# Patient Record
Sex: FEMALE | Race: WHITE | Employment: FULL TIME | RURAL
[De-identification: names, ages, dates, MRNs, and addresses within clinical notes are randomized per-mention and may not be internally consistent; named-entity substitution may affect disease eponyms.]

---

## 2017-01-26 ENCOUNTER — OFFICE VISIT (OUTPATIENT)
Dept: FAMILY MEDICINE CLINIC | Age: 57
End: 2017-01-26

## 2017-01-26 VITALS
HEART RATE: 80 BPM | OXYGEN SATURATION: 99 % | BODY MASS INDEX: 19.91 KG/M2 | RESPIRATION RATE: 17 BRPM | SYSTOLIC BLOOD PRESSURE: 143 MMHG | DIASTOLIC BLOOD PRESSURE: 92 MMHG | WEIGHT: 116 LBS

## 2017-01-26 DIAGNOSIS — G89.4 CHRONIC PAIN SYNDROME: ICD-10-CM

## 2017-01-26 DIAGNOSIS — F33.41 RECURRENT MAJOR DEPRESSIVE DISORDER, IN PARTIAL REMISSION (HCC): ICD-10-CM

## 2017-01-26 DIAGNOSIS — J01.00 ACUTE MAXILLARY SINUSITIS, RECURRENCE NOT SPECIFIED: Primary | ICD-10-CM

## 2017-01-26 RX ORDER — FLUTICASONE PROPIONATE 50 MCG
2 SPRAY, SUSPENSION (ML) NASAL DAILY
Qty: 1 BOTTLE | Refills: 2 | Status: SHIPPED | OUTPATIENT
Start: 2017-01-26

## 2017-01-26 RX ORDER — AMOXICILLIN AND CLAVULANATE POTASSIUM 875; 125 MG/1; MG/1
1 TABLET, FILM COATED ORAL 2 TIMES DAILY
Qty: 20 TAB | Refills: 0 | Status: SHIPPED | OUTPATIENT
Start: 2017-01-26 | End: 2017-02-05

## 2017-01-26 RX ORDER — METHADONE HYDROCHLORIDE 5 MG/1
7.5 TABLET ORAL
Qty: 90 TAB | Refills: 0 | Status: SHIPPED | OUTPATIENT
Start: 2017-01-26 | End: 2017-02-28 | Stop reason: SDUPTHER

## 2017-01-26 RX ORDER — AMLODIPINE BESYLATE 10 MG/1
TABLET ORAL
COMMUNITY
Start: 2016-12-20 | End: 2017-03-21 | Stop reason: SDUPTHER

## 2017-01-26 NOTE — MR AVS SNAPSHOT
Visit Information Date & Time Provider Department Dept. Phone Encounter #  
 1/26/2017 10:30 AM Shawn Gonzalez NP [address] [place] [address] [id number] Your Appointments 1/31/2017  1:00 PM  
ESTABLISHED PATIENT with ROXANNA Espitiavineetgen 38 ([facility] Appt Note: 1wk fu  
 [address] [facility] [address] [facility],5Th Floor [address] [phone]  
  
   
 [address] [facility] [address] [place] [address],5[address] [address] Upcoming Health Maintenance Date Due Hepatitis C Screening 1960 Pneumococcal 19-64 Medium Risk (1 of 1 - PPSV23) 4/8/1980 DTaP/Tdap/Td series (1 - Tdap) 4/8/1982 PAP AKA CERVICAL CYTOLOGY 4/8/1982 BREAST CANCER SCRN MAMMOGRAM 4/8/2011 FOBT Q 1 YEAR AGE 50-75 4/8/2011 Allergies as of 1/26/2017  Review Complete On: 1/26/2017 By: Shawn Gonzalez NP Severity Noted Reaction Type Reactions Labetalol Medium 06/07/2016    Diarrhea Losartan Medium 02/11/2016    Diarrhea GI Intolerance Current Immunizations  Never Reviewed No immunizations on file. Not reviewed this visit You Were Diagnosed With   
  
 Codes Comments Acute maxillary sinusitis, recurrence not specified    -  Primary ICD-10-CM: J01.00 ICD-9-CM: 461.0 Recurrent major depressive disorder, in partial remission ([facility]     ICD-10-CM: F33.41 
ICD-9-CM: 296.35 Chronic pain syndrome     ICD-10-CM: G89.4 ICD-9-CM: 338. 4 Vitals BP Pulse Resp Weight(growth percentile) SpO2 BMI  
 (!) 143/92 (BP 1 Location: Left arm, BP Patient Position: Sitting) 80 17 116 lb (52.6 kg) 99% 19.91 kg/m2 OB Status Smoking Status Menopause Current Every Day Smoker Vitals History BMI and BSA Data Body Mass Index Body Surface Area  
 19.91 kg/m 2 1.54 m 2 Preferred Pharmacy Pharmacy Name Phone  [address], [address] [facility] [address] [facility] AT [facility] OF  3 & [facility] [phone] Your Updated Medication List  
  
   
This list is accurate as of: 1/26/17 12:15 PM.  Always use your most recent med list.  
  
  
  
  
 ALPRAZolam 1 mg tablet Commonly known as:  Darron Sequin Take 1 Tab by mouth three (3) times daily as needed for Anxiety. Max Daily Amount: 3 mg.  
  
 amitriptyline 25 mg tablet Commonly known as:  ELAVIL Take 1 Tab by mouth nightly. Indications: NEUROPATHIC PAIN  
  
 amLODIPine 10 mg tablet Commonly known as:  NORVASC  
  
 amoxicillin-clavulanate 875-125 mg per tablet Commonly known as:  AUGMENTIN Take 1 Tab by mouth two (2) times a day for 10 days. * chlorthalidone 25 mg tablet Commonly known as:  Melina Fleeting Take 1 Tab by mouth daily. Indications: HYPERTENSION * chlorthalidone 25 mg tablet Commonly known as:  Melina Fleeting Take 1 Tab by mouth daily. fluticasone 50 mcg/actuation nasal spray Commonly known as:  Filbert Chard 2 Sprays by Both Nostrils route daily. levETIRAcetam 1,000 mg tablet Take 1 Tab by mouth two (2) times a day. Indications: TONIC-CLONIC EPILEPSY TREATMENT ADJUNCT  
  
 * methadone 5 mg tablet Commonly known as:  DOLOPHINE Take 1 Tab by mouth every eight (8) hours as needed for Pain. Max Daily Amount: 15 mg. Indications: CHRONIC PAIN  
  
 * methadone 5 mg tablet Commonly known as:  DOLOPHINE  
1.5 tabs in am; 1 tab po pm.  Indications: CHRONIC PAIN  
  
 * methadone 5 mg tablet Commonly known as:  DOLOPHINE Take 1.5 Tabs by mouth two (2) times daily (after meals). Max Daily Amount: 15 mg.  
  
 sertraline 100 mg tablet Commonly known as:  ZOLOFT Take 1 Tab by mouth daily. Indications: ANXIETY WITH DEPRESSION  
  
 * topiramate 25 mg tablet Commonly known as:  TOPAMAX TAKE 1 TABLET BY MOUTH EVERY NIGHT AT BEDTIME FOR 1 WEEK THEN INCREASE TO 1 TABLET BY MOUTH TWICE DAILY FOR PAIN  
  
 * topiramate 50 mg tablet Commonly known as:  TOPAMAX Take 1 Tab by mouth two (2) times a day. * Notice: This list has 7 medication(s) that are the same as other medications prescribed for you. Read the directions carefully, and ask your doctor or other care provider to review them with you. Prescriptions Printed Refills  
 methadone (DOLOPHINE) 5 mg tablet 0 Sig: Take 1.5 Tabs by mouth two (2) times daily (after meals). Max Daily Amount: 15 mg.  
 Class: Print Route: Oral  
  
Prescriptions Sent to Pharmacy Refills  
 amoxicillin-clavulanate (AUGMENTIN) 875-125 mg per tablet 0 Sig: Take 1 Tab by mouth two (2) times a day for 10 days. Class: Normal  
 Pharmacy: [facility] [address]  Ph #: [phone] Route: Oral  
 fluticasone (FLONASE) 50 mcg/actuation nasal spray 2 Si Sprays by Both Nostrils route daily. Class: Normal  
 Pharmacy: [facility] [address]  Ph #: [phone] Route: Both Nostrils Introducing [facility]! Becky Miguel introduces [employer] patient portal. Now you can access parts of your medical record, email your doctor's office, and request medication refills online. 1. In your internet browser, go to [email or web address] 2. Click on the First Time User? Click Here link in the Sign In box. You will see the New Member Sign Up page. 3. Enter your [employer] Access Code exactly as it appears below. You will not need to use this code after youve completed the sign-up process. If you do not sign up before the expiration date, you must request a new code. · [employer] Access Code: [email or web address] Expires: 2017 12:15 PM 
 
4. Enter the last four digits of your Social Security Number (xxxx) and Date of Birth (mm/dd/yyyy) as indicated and click Submit. You will be taken to the next sign-up page. 5. Create a [employer] ID. This will be your [employer] login ID and cannot be changed, so think of one that is secure and easy to remember. 6. Create a [employer] password. You can change your password at any time. 7. Enter your Password Reset Question and Answer. This can be used at a later time if you forget your password. 8. Enter your e-mail address. You will receive e-mail notification when new information is available in [address] 9. Click Sign Up. You can now view and download portions of your medical record. 10. Click the Download Summary menu link to download a portable copy of your medical information. If you have questions, please visit the Frequently Asked Questions section of the [employer] website. Remember, [employer] is NOT to be used for urgent needs. For medical emergencies, dial 911. Now available from your iPhone and Android! Please provide this summary of care documentation to your next provider. Your primary care clinician is listed as James Cardenas. If you have any questions after today's visit, please call [phone].

## 2017-01-26 NOTE — PROGRESS NOTES
1/28/2017    Chief Complaint   Patient presents with    Medication Refill       HPI: Kayley Pal is a 55 y.o. female. Complicated history, depression. Doesn't want anyone to know that I am sick. Tearful. Needs refill of her pain medication. She did attend [facility] IOP program. She monge not want to go back. States she is drinking 3-4 beers a day. New Problem: URI X past 10-14 days. Nasal congestion, headache and pressure. Allergies   Allergen Reactions    Labetalol Diarrhea    Losartan Diarrhea     GI Intolerance         Current Outpatient Prescriptions   Medication Sig Dispense Refill    amLODIPine (NORVASC) 10 mg tablet       methadone (DOLOPHINE) 5 mg tablet Take 1.5 Tabs by mouth two (2) times daily (after meals). Max Daily Amount: 15 mg. 90 Tab 0    amoxicillin-clavulanate (AUGMENTIN) 875-125 mg per tablet Take 1 Tab by mouth two (2) times a day for 10 days. 20 Tab 0    fluticasone (FLONASE) 50 mcg/actuation nasal spray 2 Sprays by Both Nostrils route daily. 1 Bottle 2    ALPRAZolam (XANAX) 1 mg tablet Take 1 Tab by mouth three (3) times daily as needed for Anxiety. Max Daily Amount: 3 mg. 90 Tab 2    methadone (DOLOPHINE) 5 mg tablet 1.5 tabs in am; 1 tab po pm.  Indications: CHRONIC PAIN 75 Tab 0    amitriptyline (ELAVIL) 25 mg tablet Take 1 Tab by mouth nightly. Indications: NEUROPATHIC PAIN 30 Tab 2    topiramate (TOPAMAX) 50 mg tablet Take 1 Tab by mouth two (2) times a day. 60 Tab 5    chlorthalidone (HYGROTEN) 25 mg tablet Take 1 Tab by mouth daily. 30 Tab 11    topiramate (TOPAMAX) 25 mg tablet TAKE 1 TABLET BY MOUTH EVERY NIGHT AT BEDTIME FOR 1 WEEK THEN INCREASE TO 1 TABLET BY MOUTH TWICE DAILY FOR PAIN 60 Tab 0    methadone (DOLOPHINE) 5 mg tablet Take 1 Tab by mouth every eight (8) hours as needed for Pain. Max Daily Amount: 15 mg. Indications: CHRONIC PAIN 90 Tab 0    chlorthalidone (HYGROTEN) 25 mg tablet Take 1 Tab by mouth daily.  Indications: HYPERTENSION 30 Tab 5    sertraline (ZOLOFT) 100 mg tablet Take 1 Tab by mouth daily. Indications: ANXIETY WITH DEPRESSION 30 Tab 5    levETIRAcetam 1,000 mg tablet Take 1 Tab by mouth two (2) times a day. Indications: TONIC-CLONIC EPILEPSY TREATMENT ADJUNCT 60 Tab 5       Past Medical History   Diagnosis Date    Anxiety     Chronic pain     Depression     Hypertension     Insomnia        Lab Results   Component Value Date/Time    Glucose 96 10/27/2016 11:11 AM    Creatinine 0.83 10/27/2016 11:11 AM       ROS:  Constitutional: No fever, chills or weight loss  Respiratory: No cough, SOB   CV: No chest pain or Palpitations  GI: No nausea, vomiting or diarrhea. : No dysuria or hematuria. Neuro: No headaches, seizures, change in mental status. Physical Exam:   VS Visit Vitals    BP (!) 143/92 (BP 1 Location: Left arm, BP Patient Position: Sitting)    Pulse 80    Resp 17    Wt 116 lb (52.6 kg)    SpO2 99%    BMI 19.91 kg/m2      General  Alert, crying. Flat affect. HEENT Eyes are puffy. Tender bilateral maxillary sinuses. Nares: Mucosa is erythematous, swollen with mucopurulent rhinorrhea  Pharynx clear. No nodes   RESP Clear to auscultation and percussion. No rales, wheezes, rhonchi, or rubs. CV RRR, with no S3 or S4, no murmur, no rub. Aorta: no bruit. MSKEL Gait: forward flexed. Normal strength and tone, no atrophy. EXT No deformity. Extremities without edema. SKIN Skin warm, normal turgor. NEURO Cranial nerves normal 2-12. No abnormal movement  Sensation vibration and filament   DTR 2+ in upper and lower extremities. PSYCH Judgment and insight good. Oriented to person, place, and time. Affect is alert and attentive. 1. Recurrent major depressive disorder, in partial remission ([facility]  Offer support. 2. Chronic pain syndrome  Review PDP. Refill meds. Continue current dose X 1 month   - methadone (DOLOPHINE) 5 mg tablet;  Take 1.5 Tabs by mouth two (2) times daily (after meals). Max Daily Amount: 15 mg. Dispense: 90 Tab; Refill: 0    3. Acute maxillary sinusitis, recurrence not specified  Self care: Mucinex, nasal saline.   - amoxicillin-clavulanate (AUGMENTIN) 875-125 mg per tablet; Take 1 Tab by mouth two (2) times a day for 10 days. Dispense: 20 Tab; Refill: 0  - fluticasone (FLONASE) 50 mcg/actuation nasal spray; 2 Sprays by Both Nostrils route daily. Dispense: 1 Bottle; Refill: 2      Orders Placed This Encounter    amLODIPine (NORVASC) 10 mg tablet    methadone (DOLOPHINE) 5 mg tablet     Sig: Take 1.5 Tabs by mouth two (2) times daily (after meals). Max Daily Amount: 15 mg. Dispense:  90 Tab     Refill:  0    amoxicillin-clavulanate (AUGMENTIN) 875-125 mg per tablet     Sig: Take 1 Tab by mouth two (2) times a day for 10 days. Dispense:  20 Tab     Refill:  0    fluticasone (FLONASE) 50 mcg/actuation nasal spray     Si Sprays by Both Nostrils route daily. Dispense:  1 Bottle     Refill:  2       Follow-up Disposition:  Return in about 1 month (around 2017).         JEREMY Novoa

## 2017-01-31 ENCOUNTER — OFFICE VISIT (OUTPATIENT)
Dept: FAMILY MEDICINE CLINIC | Age: 57
End: 2017-01-31

## 2017-01-31 VITALS
WEIGHT: 114.4 LBS | SYSTOLIC BLOOD PRESSURE: 162 MMHG | OXYGEN SATURATION: 97 % | DIASTOLIC BLOOD PRESSURE: 95 MMHG | RESPIRATION RATE: 16 BRPM | BODY MASS INDEX: 19.64 KG/M2 | HEART RATE: 85 BPM

## 2017-01-31 DIAGNOSIS — G40.909 SEIZURE DISORDER (HCC): ICD-10-CM

## 2017-01-31 DIAGNOSIS — I10 HYPERTENSION, ACCELERATED: ICD-10-CM

## 2017-01-31 DIAGNOSIS — J01.00 ACUTE MAXILLARY SINUSITIS, RECURRENCE NOT SPECIFIED: Primary | ICD-10-CM

## 2017-01-31 RX ORDER — NEBIVOLOL 5 MG/1
5 TABLET ORAL DAILY
Qty: 28 TAB | Refills: 0 | Status: SHIPPED | OUTPATIENT
Start: 2017-01-31 | End: 2017-02-28 | Stop reason: SDUPTHER

## 2017-01-31 RX ORDER — LEVETIRACETAM 1000 MG/1
750 TABLET ORAL 2 TIMES DAILY
Qty: 60 TAB | Refills: 11 | Status: SHIPPED | OUTPATIENT
Start: 2017-01-31 | End: 2017-02-28

## 2017-01-31 NOTE — PROGRESS NOTES
1/31/2017    Chief Complaint   Patient presents with    Sinus Infection     1 week follow up       HPI: Long Lala is a 55 y.o. female. Complicated history of chronic pain, depression, anxiety, seizure disorder. History of ETOH abuse, question of whether seizure activity was a result of ETOH withdrawal. Occurred in [place]. She minimizes use.  states she has cut back although she is drinking every day. She went to [place] and mood improved. Her mental status fluctuates. Presents today for f/u of sinusitis and emotional lability. She was tearful and agitated last week. She had sinus congestion, headache. On Augmentin and Flonase. She is feeling much better. Methadone was increased back to 15 mg BID. Will attempt to decrease with next refill. Her BP is elevated today. She had previously tried Propranolol and thought it caused vasomotor sweats. Will try adding Bystolic 5mg. Allergies   Allergen Reactions    Labetalol Diarrhea    Losartan Diarrhea     GI Intolerance         Current Outpatient Prescriptions   Medication Sig Dispense Refill    nebivolol (BYSTOLIC) 5 mg tablet Take 1 Tab by mouth daily. Indications: Hypertension 28 Tab 0    levETIRAcetam 1,000 mg tablet Take 1 Tab by mouth two (2) times a day. 60 Tab 11    amLODIPine (NORVASC) 10 mg tablet       methadone (DOLOPHINE) 5 mg tablet Take 1.5 Tabs by mouth two (2) times daily (after meals). Max Daily Amount: 15 mg. 90 Tab 0    amoxicillin-clavulanate (AUGMENTIN) 875-125 mg per tablet Take 1 Tab by mouth two (2) times a day for 10 days. 20 Tab 0    fluticasone (FLONASE) 50 mcg/actuation nasal spray 2 Sprays by Both Nostrils route daily. 1 Bottle 2    ALPRAZolam (XANAX) 1 mg tablet Take 1 Tab by mouth three (3) times daily as needed for Anxiety. Max Daily Amount: 3 mg. 90 Tab 2    amitriptyline (ELAVIL) 25 mg tablet Take 1 Tab by mouth nightly.  Indications: NEUROPATHIC PAIN 30 Tab 2    topiramate (TOPAMAX) 50 mg tablet Take 1 Tab by mouth two (2) times a day. 60 Tab 5    chlorthalidone (HYGROTEN) 25 mg tablet Take 1 Tab by mouth daily. 30 Tab 11    topiramate (TOPAMAX) 25 mg tablet TAKE 1 TABLET BY MOUTH EVERY NIGHT AT BEDTIME FOR 1 WEEK THEN INCREASE TO 1 TABLET BY MOUTH TWICE DAILY FOR PAIN 60 Tab 0    methadone (DOLOPHINE) 5 mg tablet Take 1 Tab by mouth every eight (8) hours as needed for Pain. Max Daily Amount: 15 mg. Indications: CHRONIC PAIN 90 Tab 0    chlorthalidone (HYGROTEN) 25 mg tablet Take 1 Tab by mouth daily. Indications: HYPERTENSION 30 Tab 5    sertraline (ZOLOFT) 100 mg tablet Take 1 Tab by mouth daily. Indications: ANXIETY WITH DEPRESSION 30 Tab 5    levETIRAcetam 1,000 mg tablet Take 1 Tab by mouth two (2) times a day. Indications: TONIC-CLONIC EPILEPSY TREATMENT ADJUNCT 60 Tab 5       Past Medical History   Diagnosis Date    Anxiety     Chronic pain     Depression     Hypertension     Insomnia        Lab Results   Component Value Date/Time    Glucose 96 10/27/2016 11:11 AM    Creatinine 0.83 10/27/2016 11:11 AM       ROS:  Constitutional: No fever, chills or weight loss  Respiratory: No cough, SOB   CV: No chest pain or Palpitations  GI: No nausea, vomiting or diarrhea. : No dysuria or hematuria. Neuro: No headaches, seizures, change in mental status. Physical Exam:   VS Visit Vitals    BP (!) 162/95 (BP 1 Location: Right arm, BP Patient Position: Sitting)    Pulse 85    Resp 16    Wt 114 lb 6.4 oz (51.9 kg)    SpO2 97%    BMI 19.64 kg/m2      General  Alert, oriented X 3. Mood is pleasant, calm. ENMT External ears and nose normal.  Canals normal, TMs normal.   Maxillary and frontal sinuses Non-tender to palpation. Lips, teeth, gums normal, mucous membranes moist.    Oropharynx: no erythema, no exudates, no lesions, normal tongue. NECK Thyroid: normal size, nontender. Trachea midline, neck symmetrical and without masses. Carotids 2+ with no bruits. No enlarged nodes.    RESP Clear to auscultation and percussion. CV RRR. MSKEL Normal gait and station. Normal strength and tone, no atrophy. EXT No deformity. Extremities without edema. SKIN Skin warm, normal turgor. NEURO Cranial nerves normal 2-12. PSYCH Judgment and insight fair. Oriented to person, place, and time. Affect is alert and attentive. 1. Acute maxillary sinusitis, recurrence not specified  Much better on Augmentin. Continue. 2. Hypertension, accelerated  Add Bystolic 5mg #34 1 po daily    3. Seizure disorder (HCC)  Stable   Needs refill    4. BipolarDisorder / Depression/ ETOH abuse. Her mental status and mood is much better today. Monitor. Follow-up Disposition:  Return in about 3 weeks (around 2/21/2017).         JEREMY Raymond

## 2017-01-31 NOTE — MR AVS SNAPSHOT
Visit Information Date & Time Provider Department Dept. Phone Encounter #  
 1/31/2017  1:00 PM Vern Virk NP [facility] [id number] Upcoming Health Maintenance Date Due Hepatitis C Screening 1960 Pneumococcal 19-64 Medium Risk (1 of 1 - PPSV23) 4/8/1980 DTaP/Tdap/Td series (1 - Tdap) 4/8/1982 PAP AKA CERVICAL CYTOLOGY 4/8/1982 BREAST CANCER SCRN MAMMOGRAM 4/8/2011 FOBT Q 1 YEAR AGE 50-75 4/8/2011 Allergies as of 1/31/2017  Review Complete On: 1/31/2017 By: Vern Virk NP Severity Noted Reaction Type Reactions Labetalol Medium 06/07/2016    Diarrhea Losartan Medium 02/11/2016    Diarrhea GI Intolerance Current Immunizations  Never Reviewed No immunizations on file. Not reviewed this visit Vitals BP Pulse Resp Weight(growth percentile) SpO2 BMI  
 (!) 162/95 (BP 1 Location: Right arm, BP Patient Position: Sitting) 85 16 114 lb 6.4 oz (51.9 kg) 97% 19.64 kg/m2 OB Status Smoking Status Menopause Current Every Day Smoker Vitals History BMI and BSA Data Body Mass Index Body Surface Area 19.64 kg/m 2 1.53 m 2 Preferred Pharmacy Pharmacy Name Phone [address] [facility] AT [facility] OF  3 & [facility]. Radha Aponte [phone] Your Updated Medication List  
  
   
This list is accurate as of: 1/31/17  1:40 PM.  Always use your most recent med list.  
  
  
  
  
 ALPRAZolam 1 mg tablet Commonly known as:  Shanae Maria R Take 1 Tab by mouth three (3) times daily as needed for Anxiety. Max Daily Amount: 3 mg.  
  
 amitriptyline 25 mg tablet Commonly known as:  ELAVIL Take 1 Tab by mouth nightly. Indications: NEUROPATHIC PAIN  
  
 amLODIPine 10 mg tablet Commonly known as:  NORVASC  
  
 amoxicillin-clavulanate 875-125 mg per tablet Commonly known as:  AUGMENTIN  
 Take 1 Tab by mouth two (2) times a day for 10 days. * chlorthalidone 25 mg tablet Commonly known as:  Anita Bolk Take 1 Tab by mouth daily. Indications: HYPERTENSION * chlorthalidone 25 mg tablet Commonly known as:  Anita Bolk Take 1 Tab by mouth daily. fluticasone 50 mcg/actuation nasal spray Commonly known as:  Natasha Serene 2 Sprays by Both Nostrils route daily. levETIRAcetam 1,000 mg tablet Take 1 Tab by mouth two (2) times a day. Indications: TONIC-CLONIC EPILEPSY TREATMENT ADJUNCT  
  
 * methadone 5 mg tablet Commonly known as:  DOLOPHINE Take 1 Tab by mouth every eight (8) hours as needed for Pain. Max Daily Amount: 15 mg. Indications: CHRONIC PAIN  
  
 * methadone 5 mg tablet Commonly known as:  DOLOPHINE Take 1.5 Tabs by mouth two (2) times daily (after meals). Max Daily Amount: 15 mg.  
  
 sertraline 100 mg tablet Commonly known as:  ZOLOFT Take 1 Tab by mouth daily. Indications: ANXIETY WITH DEPRESSION  
  
 * topiramate 25 mg tablet Commonly known as:  TOPAMAX TAKE 1 TABLET BY MOUTH EVERY NIGHT AT BEDTIME FOR 1 WEEK THEN INCREASE TO 1 TABLET BY MOUTH TWICE DAILY FOR PAIN  
  
 * topiramate 50 mg tablet Commonly known as:  TOPAMAX Take 1 Tab by mouth two (2) times a day. * Notice: This list has 6 medication(s) that are the same as other medications prescribed for you. Read the directions carefully, and ask your doctor or other care provider to review them with you. Introducing hospitals & [facility]! Alaina Kowalski introduces [employer] patient portal. Now you can access parts of your medical record, email your doctor's office, and request medication refills online. 1. In your internet browser, go to [email or web address] 2. Click on the First Time User? Click Here link in the Sign In box. You will see the New Member Sign Up page. 3. Enter your [employer] Access Code exactly as it appears below.  You will not need to use this code after youve completed the sign-up process. If you do not sign up before the expiration date, you must request a new code. · [employer] Access Code: [email or web address] Expires: 4/26/2017 12:15 PM 
 
4. Enter the last four digits of your Social Security Number (xxxx) and Date of Birth (mm/dd/yyyy) as indicated and click Submit. You will be taken to the next sign-up page. 5. Create a [employer] ID. This will be your [employer] login ID and cannot be changed, so think of one that is secure and easy to remember. 6. Create a [employer] password. You can change your password at any time. 7. Enter your Password Reset Question and Answer. This can be used at a later time if you forget your password. 8. Enter your e-mail address. You will receive e-mail notification when new information is available in [address] 9. Click Sign Up. You can now view and download portions of your medical record. 10. Click the Download Summary menu link to download a portable copy of your medical information. If you have questions, please visit the Frequently Asked Questions section of the [employer] website. Remember, [employer] is NOT to be used for urgent needs. For medical emergencies, dial 911. Now available from your iPhone and Android! Please provide this summary of care documentation to your next provider. Your primary care clinician is listed as Estefania Bliss. If you have any questions after today's visit, please call [phone].

## 2017-02-03 DIAGNOSIS — G89.4 CHRONIC PAIN SYNDROME: ICD-10-CM

## 2017-02-03 DIAGNOSIS — F33.41 RECURRENT MAJOR DEPRESSIVE DISORDER, IN PARTIAL REMISSION (HCC): ICD-10-CM

## 2017-02-03 NOTE — TELEPHONE ENCOUNTER
[phone] contact number, per patient need refill on following medication as need a hard copy as patient is leaving for [place] today @ 3:00pm.  Please call asap when you get script ready [phone]. Medication is  Methadone (DOLOPHINE) 5 mg tablet 1 1/2 am & 1 1/2 pm   Eliza Cote please give patient a call. ....   Thanks,

## 2017-02-09 RX ORDER — METHADONE HYDROCHLORIDE 5 MG/1
5 TABLET ORAL
Qty: 90 TAB | Refills: 0 | Status: SHIPPED | OUTPATIENT
Start: 2017-02-09 | End: 2017-02-28

## 2017-02-21 ENCOUNTER — TELEPHONE (OUTPATIENT)
Dept: FAMILY MEDICINE CLINIC | Age: 57
End: 2017-02-21

## 2017-02-21 NOTE — TELEPHONE ENCOUNTER
[phone] contact # per patient, please call when you get a minute as paperwork is all messed up with her & Antwan Drummond and she doesn't want to give him the wrong thing, it's concerning medications and need a little help.   Thanks,

## 2017-02-21 NOTE — TELEPHONE ENCOUNTER
Kelly Walter asked if Cas's nurse could call her back at [phone] in regards to medication. Thank you.

## 2017-02-28 ENCOUNTER — OFFICE VISIT (OUTPATIENT)
Dept: FAMILY MEDICINE CLINIC | Age: 57
End: 2017-02-28

## 2017-02-28 VITALS
HEART RATE: 78 BPM | BODY MASS INDEX: 19.57 KG/M2 | OXYGEN SATURATION: 92 % | DIASTOLIC BLOOD PRESSURE: 102 MMHG | SYSTOLIC BLOOD PRESSURE: 190 MMHG | WEIGHT: 114 LBS | RESPIRATION RATE: 17 BRPM

## 2017-02-28 DIAGNOSIS — F32.A DEPRESSION, UNSPECIFIED DEPRESSION TYPE: ICD-10-CM

## 2017-02-28 DIAGNOSIS — G40.909 SEIZURE DISORDER (HCC): ICD-10-CM

## 2017-02-28 DIAGNOSIS — G89.4 CHRONIC PAIN SYNDROME: ICD-10-CM

## 2017-02-28 DIAGNOSIS — G47.00 INSOMNIA, UNSPECIFIED TYPE: ICD-10-CM

## 2017-02-28 RX ORDER — TOPIRAMATE 50 MG/1
50 TABLET, FILM COATED ORAL 2 TIMES DAILY
Qty: 60 TAB | Refills: 5 | Status: SHIPPED | OUTPATIENT
Start: 2017-02-28 | End: 2017-10-12 | Stop reason: SDUPTHER

## 2017-02-28 RX ORDER — AMITRIPTYLINE HYDROCHLORIDE 25 MG/1
25 TABLET, FILM COATED ORAL
Qty: 30 TAB | Refills: 2 | Status: SHIPPED | OUTPATIENT
Start: 2017-02-28 | End: 2017-03-21 | Stop reason: DRUGHIGH

## 2017-02-28 RX ORDER — METHADONE HYDROCHLORIDE 5 MG/1
7.5 TABLET ORAL
Qty: 90 TAB | Refills: 0 | Status: SHIPPED | OUTPATIENT
Start: 2017-02-28 | End: 2017-03-21 | Stop reason: SDUPTHER

## 2017-02-28 RX ORDER — NEBIVOLOL 10 MG/1
10 TABLET ORAL DAILY
Qty: 28 TAB | Refills: 0
Start: 2017-02-28 | End: 2017-05-25 | Stop reason: SDUPTHER

## 2017-02-28 NOTE — MR AVS SNAPSHOT
Visit Information Date & Time Provider Department Dept. Phone Encounter #  
 2/28/2017  2:30 PM Shawn Gonzalez NP [address] [facility] [id number] Upcoming Health Maintenance Date Due Hepatitis C Screening 1960 Pneumococcal 19-64 Medium Risk (1 of 1 - PPSV23) 4/8/1980 DTaP/Tdap/Td series (1 - Tdap) 4/8/1982 PAP AKA CERVICAL CYTOLOGY 4/8/1982 BREAST CANCER SCRN MAMMOGRAM 4/8/2011 FOBT Q 1 YEAR AGE 50-75 4/8/2011 Allergies as of 2/28/2017  Review Complete On: 2/28/2017 By: Shawn Gonzalez NP Severity Noted Reaction Type Reactions Labetalol Medium 06/07/2016    Diarrhea Losartan Medium 02/11/2016    Diarrhea GI Intolerance Current Immunizations  Never Reviewed No immunizations on file. Not reviewed this visit You Were Diagnosed With   
  
 Codes Comments Depression, unspecified depression type     ICD-10-CM: F32.9 ICD-9-CM: 273 Chronic pain syndrome     ICD-10-CM: G89.4 ICD-9-CM: 338.4 Insomnia, unspecified type     ICD-10-CM: G47.00 ICD-9-CM: 780.52 Seizure disorder ([facility] [address]     ICD-10-CM: G40.909 ICD-9-CM: 345.90 Vitals BP  
  
  
  
  
  
 (!) 190/102 (BP 1 Location: Left arm, BP Patient Position: Sitting) Vitals History BMI and BSA Data Body Mass Index Body Surface Area  
 19.57 kg/m 2 1.53 m 2 Preferred Pharmacy Pharmacy Name Phone [address], [address] AT [facility] OF  [address] & [facility]. Mi Salguero [phone] Your Updated Medication List  
  
   
This list is accurate as of: 2/28/17  4:46 PM.  Always use your most recent med list.  
  
  
  
  
 ALPRAZolam 1 mg tablet Commonly known as:  Emeka Beecham Take 1 Tab by mouth three (3) times daily as needed for Anxiety. Max Daily Amount: 3 mg.  
  
 amitriptyline 25 mg tablet Commonly known as:  ELAVIL  
 Take 1 Tab by mouth nightly. Indications: NEUROPATHIC PAIN  
  
 amLODIPine 10 mg tablet Commonly known as:  NORVASC  
  
 chlorthalidone 25 mg tablet Commonly known as:  Nevin Pulling Take 1 Tab by mouth daily. Indications: HYPERTENSION  
  
 fluticasone 50 mcg/actuation nasal spray Commonly known as:  Caffie Euler 2 Sprays by Both Nostrils route daily. levETIRAcetam 1,000 mg tablet Take 1 Tab by mouth two (2) times a day. Indications: TONIC-CLONIC EPILEPSY TREATMENT ADJUNCT  
  
 methadone 5 mg tablet Commonly known as:  DOLOPHINE Take 1.5 Tabs by mouth two (2) times daily (after meals). Max Daily Amount: 15 mg.  
  
 nebivolol 10 mg tablet Commonly known as:  BYSTOLIC Take 1 Tab by mouth daily. Indications: hypertension  
  
 sertraline 100 mg tablet Commonly known as:  ZOLOFT Take 1 Tab by mouth daily. Indications: ANXIETY WITH DEPRESSION  
  
 topiramate 50 mg tablet Commonly known as:  TOPAMAX Take 1 Tab by mouth two (2) times a day. Prescriptions Printed Refills  
 methadone (DOLOPHINE) 5 mg tablet 0 Sig: Take 1.5 Tabs by mouth two (2) times daily (after meals). Max Daily Amount: 15 mg.  
 Class: Print Route: Oral  
  
Prescriptions Sent to Pharmacy Refills  
 amitriptyline (ELAVIL) 25 mg tablet 2 Sig: Take 1 Tab by mouth nightly. Indications: NEUROPATHIC PAIN Class: Normal  
 Pharmacy: [facility] [address]  Ph #: [phone] Route: Oral  
 topiramate (TOPAMAX) 50 mg tablet 5 Sig: Take 1 Tab by mouth two (2) times a day. Class: Normal  
 Pharmacy: [facility] [address]  Ph #: [phone] Route: Oral  
  
Introducing [facility]!    
 Lovely Jones introduces [employer] patient portal. Now you can access parts of your medical record, email your doctor's office, and request medication refills online. 1. In your internet browser, go to [email or web address] 2. Click on the First Time User? Click Here link in the Sign In box. You will see the New Member Sign Up page. 3. Enter your [employer] Access Code exactly as it appears below. You will not need to use this code after youve completed the sign-up process. If you do not sign up before the expiration date, you must request a new code. · [employer] Access Code: [email or web address] Expires: 4/26/2017 12:15 PM 
 
4. Enter the last four digits of your Social Security Number (xxxx) and Date of Birth (mm/dd/yyyy) as indicated and click Submit. You will be taken to the next sign-up page. 5. Create a Fromography ID. This will be your [employer] login ID and cannot be changed, so think of one that is secure and easy to remember. 6. Create a [employer] password. You can change your password at any time. 7. Enter your Password Reset Question and Answer. This can be used at a later time if you forget your password. 8. Enter your e-mail address. You will receive e-mail notification when new information is available in [address] 9. Click Sign Up. You can now view and download portions of your medical record. 10. Click the Download Summary menu link to download a portable copy of your medical information. If you have questions, please visit the Frequently Asked Questions section of the [employer] website. Remember, [employer] is NOT to be used for urgent needs. For medical emergencies, dial 911. Now available from your iPhone and Android! Please provide this summary of care documentation to your next provider. Your primary care clinician is listed as Diane Chauhan. If you have any questions after today's visit, please call [phone].

## 2017-03-01 NOTE — PROGRESS NOTES
2/28/2017    Chief Complaint   Patient presents with    Medication Refill       HPI: Kayley Pal is a 55 y.o. female. Presents for refill of her pain medication. BP elevated. Allergies   Allergen Reactions    Labetalol Diarrhea    Losartan Diarrhea     GI Intolerance         Current Outpatient Prescriptions   Medication Sig Dispense Refill    nebivolol (BYSTOLIC) 10 mg tablet Take 1 Tab by mouth daily. Indications: hypertension 28 Tab 0    amitriptyline (ELAVIL) 25 mg tablet Take 1 Tab by mouth nightly. Indications: NEUROPATHIC PAIN 30 Tab 2    topiramate (TOPAMAX) 50 mg tablet Take 1 Tab by mouth two (2) times a day. 60 Tab 5    methadone (DOLOPHINE) 5 mg tablet Take 1.5 Tabs by mouth two (2) times daily (after meals). Max Daily Amount: 15 mg. 90 Tab 0    amLODIPine (NORVASC) 10 mg tablet       fluticasone (FLONASE) 50 mcg/actuation nasal spray 2 Sprays by Both Nostrils route daily. 1 Bottle 2    ALPRAZolam (XANAX) 1 mg tablet Take 1 Tab by mouth three (3) times daily as needed for Anxiety. Max Daily Amount: 3 mg. 90 Tab 2    chlorthalidone (HYGROTEN) 25 mg tablet Take 1 Tab by mouth daily. Indications: HYPERTENSION 30 Tab 5    sertraline (ZOLOFT) 100 mg tablet Take 1 Tab by mouth daily. Indications: ANXIETY WITH DEPRESSION 30 Tab 5    levETIRAcetam 1,000 mg tablet Take 1 Tab by mouth two (2) times a day. Indications: TONIC-CLONIC EPILEPSY TREATMENT ADJUNCT 60 Tab 5       Past Medical History:   Diagnosis Date    Anxiety     Chronic pain     Depression     Hypertension     Insomnia        Lab Results   Component Value Date/Time    Glucose 96 10/27/2016 11:11 AM    Creatinine 0.83 10/27/2016 11:11 AM       ROS:  Constitutional: No fever, chills or weight loss  Respiratory: No cough, SOB   CV: No chest pain or Palpitations  GI: No nausea, vomiting or diarrhea. : No dysuria or hematuria. Neuro: No headaches, seizures, change in mental status.     Physical Exam:   VS Visit Vitals    BP (!) 190/102 (BP 1 Location: Left arm, BP Patient Position: Sitting)    Pulse 78    Resp 17    Wt 114 lb (51.7 kg)    SpO2 92%    BMI 19.57 kg/m2      General  Alert, oriented. NAD. Affect is calm and pleasant. RESP Clear to auscultation and percussion. No rales, wheezes, rhonchi, or rubs. CV RRR, with no S3 or S4, no murmur, no rub. Aorta: no bruit. SKIN Skin warm, normal turgor. NEURO Cranial nerves normal 2-12. PSYCH Judgment and insight good. Oriented to person, place, and time. Affect is alert and attentive. 1. Depression, unspecified depression type  Refill   - amitriptyline (ELAVIL) 25 mg tablet; Take 1 Tab by mouth nightly. Indications: NEUROPATHIC PAIN  Dispense: 30 Tab; Refill: 2    2. Chronic pain syndrome  Refill   - amitriptyline (ELAVIL) 25 mg tablet; Take 1 Tab by mouth nightly. Indications: NEUROPATHIC PAIN  Dispense: 30 Tab; Refill: 2  - topiramate (TOPAMAX) 50 mg tablet; Take 1 Tab by mouth two (2) times a day. Dispense: 60 Tab; Refill: 5  - methadone (DOLOPHINE) 5 mg tablet; Take 1.5 Tabs by mouth two (2) times daily (after meals). Max Daily Amount: 15 mg. Dispense: 90 Tab; Refill: 0    3. Insomnia, unspecified type  Refill   - amitriptyline (ELAVIL) 25 mg tablet; Take 1 Tab by mouth nightly. Indications: NEUROPATHIC PAIN  Dispense: 30 Tab; Refill: 2    4. Seizure disorder (HCC)  Refill   - topiramate (TOPAMAX) 50 mg tablet; Take 1 Tab by mouth two (2) times a day. Dispense: 60 Tab; Refill: 5        Orders Placed This Encounter    nebivolol (BYSTOLIC) 10 mg tablet     Sig: Take 1 Tab by mouth daily. Indications: hypertension     Dispense:  28 Tab     Refill:  0    amitriptyline (ELAVIL) 25 mg tablet     Sig: Take 1 Tab by mouth nightly. Indications: NEUROPATHIC PAIN     Dispense:  30 Tab     Refill:  2    topiramate (TOPAMAX) 50 mg tablet     Sig: Take 1 Tab by mouth two (2) times a day.      Dispense:  60 Tab     Refill:  5    methadone (DOLOPHINE) 5 mg tablet     Sig: Take 1.5 Tabs by mouth two (2) times daily (after meals). Max Daily Amount: 15 mg. Dispense:  90 Tab     Refill:  0       Follow-up Disposition:  Return in about 1 month (around 3/28/2017).         JEREMY Reardon

## 2017-03-21 ENCOUNTER — OFFICE VISIT (OUTPATIENT)
Dept: FAMILY MEDICINE CLINIC | Age: 57
End: 2017-03-21

## 2017-03-21 VITALS
SYSTOLIC BLOOD PRESSURE: 140 MMHG | BODY MASS INDEX: 19.33 KG/M2 | HEART RATE: 71 BPM | RESPIRATION RATE: 18 BRPM | WEIGHT: 112.6 LBS | DIASTOLIC BLOOD PRESSURE: 80 MMHG | OXYGEN SATURATION: 98 %

## 2017-03-21 DIAGNOSIS — G47.00 INSOMNIA, UNSPECIFIED TYPE: ICD-10-CM

## 2017-03-21 DIAGNOSIS — I10 ESSENTIAL HYPERTENSION WITH GOAL BLOOD PRESSURE LESS THAN 130/80: ICD-10-CM

## 2017-03-21 DIAGNOSIS — F32.A DEPRESSION, UNSPECIFIED DEPRESSION TYPE: ICD-10-CM

## 2017-03-21 DIAGNOSIS — G89.4 CHRONIC PAIN SYNDROME: ICD-10-CM

## 2017-03-21 RX ORDER — HYDROCHLOROTHIAZIDE 25 MG/1
25 TABLET ORAL DAILY
Qty: 90 TAB | Refills: 3 | Status: SHIPPED | OUTPATIENT
Start: 2017-03-21 | End: 2017-12-08 | Stop reason: ALTCHOICE

## 2017-03-21 RX ORDER — METHADONE HYDROCHLORIDE 5 MG/1
TABLET ORAL
Qty: 63 TAB | Refills: 0 | Status: SHIPPED | OUTPATIENT
Start: 2017-03-21 | End: 2017-04-25 | Stop reason: SDUPTHER

## 2017-03-21 RX ORDER — AMLODIPINE BESYLATE 10 MG/1
10 TABLET ORAL DAILY
Qty: 90 TAB | Refills: 3 | Status: SHIPPED | OUTPATIENT
Start: 2017-03-21 | End: 2017-05-25 | Stop reason: SDUPTHER

## 2017-03-21 RX ORDER — AMITRIPTYLINE HYDROCHLORIDE 50 MG/1
50 TABLET, FILM COATED ORAL
Qty: 30 TAB | Refills: 5 | Status: SHIPPED | OUTPATIENT
Start: 2017-03-21 | End: 2017-07-24

## 2017-03-21 NOTE — MR AVS SNAPSHOT
Visit Information Date & Time Provider Department Dept. Phone Encounter #  
 3/21/2017 11:30 AM Denia Redding NP [address] [place] [address] [id number] Your Appointments 4/25/2017  1:30 PM  
ESTABLISHED PATIENT with Denia Redding NP  
Giulia Melvin ([address]) Appt Note: 1 MO F/U  
 1000 [facility] [address] [facility],[address] [address] [phone]  
  
   
 [address] [address] Upcoming Health Maintenance Date Due Hepatitis C Screening 1960 Pneumococcal 19-64 Medium Risk (1 of 1 - PPSV23) 4/8/1980 DTaP/Tdap/Td series (1 - Tdap) 4/8/1982 PAP AKA CERVICAL CYTOLOGY 4/8/1982 BREAST CANCER SCRN MAMMOGRAM 4/8/2011 FOBT Q 1 YEAR AGE 50-75 4/8/2011 Allergies as of 3/21/2017  Review Complete On: 3/21/2017 By: Denia Redding NP Severity Noted Reaction Type Reactions Labetalol Medium 06/07/2016    Diarrhea Losartan Medium 02/11/2016    Diarrhea GI Intolerance Current Immunizations  Reviewed on 3/21/2017 No immunizations on file. Reviewed by Puma Leal on 3/21/2017 at 12:10 PM  
You Were Diagnosed With   
  
 Codes Comments Depression, unspecified depression type     ICD-10-CM: F32.9 ICD-9-CM: 761 Chronic pain syndrome     ICD-10-CM: G89.4 ICD-9-CM: 338.4 Insomnia, unspecified type     ICD-10-CM: G47.00 ICD-9-CM: 780.52 Essential hypertension with goal blood pressure less than 130/80     ICD-10-CM: I10 
ICD-9-CM: 401.9 Vitals BP Pulse Resp Weight(growth percentile) SpO2 BMI  
 140/80 (BP 1 Location: Left arm, BP Patient Position: Sitting) 71 18 112 lb 9.6 oz (51.1 kg) 98% 19.33 kg/m2 OB Status Smoking Status Menopause Current Every Day Smoker Vitals History BMI and BSA Data Body Mass Index Body Surface Area  
 19.33 kg/m 2 1.52 m 2 Preferred Pharmacy Pharmacy Name Phone [facility], [address] [facility] AT [facility] OF [address] & [address] [address]. Anne Maynard [phone] Your Updated Medication List  
  
   
This list is accurate as of: 3/21/17  1:13 PM.  Always use your most recent med list.  
  
  
  
  
 ALPRAZolam 1 mg tablet Commonly known as:  XANAX  
TAKE 1 TABLET BY MOUTH THREE TIMES DAILY AS NEEDED ANXIETY. DO NOT TAKE MORE THAN 3MG IN 24 HOURS. amitriptyline 50 mg tablet Commonly known as:  ELAVIL Take 1 Tab by mouth nightly. amLODIPine 10 mg tablet Commonly known as:  Leticia Million Take 1 Tab by mouth daily. chlorthalidone 25 mg tablet Commonly known as:  Rosario Bowling Take 1 Tab by mouth daily. Indications: HYPERTENSION  
  
 fluticasone 50 mcg/actuation nasal spray Commonly known as:  Jeanette Jose 2 Sprays by Both Nostrils route daily. hydroCHLOROthiazide 25 mg tablet Commonly known as:  HYDRODIURIL Take 1 Tab by mouth daily. levETIRAcetam 1,000 mg tablet Take 1 Tab by mouth two (2) times a day. Indications: TONIC-CLONIC EPILEPSY TREATMENT ADJUNCT  
  
 methadone 5 mg tablet Commonly known as:  DOLOPHINE  
1.5 tabs po BIDF X 7 days then 1 tab po BID  
  
 nebivolol 10 mg tablet Commonly known as:  BYSTOLIC Take 1 Tab by mouth daily. Indications: hypertension  
  
 sertraline 100 mg tablet Commonly known as:  ZOLOFT Take 1 Tab by mouth daily. Indications: ANXIETY WITH DEPRESSION  
  
 topiramate 50 mg tablet Commonly known as:  TOPAMAX Take 1 Tab by mouth two (2) times a day. Prescriptions Printed Refills  
 methadone (DOLOPHINE) 5 mg tablet 0 Si.5 tabs po BIDF X 7 days then 1 tab po BID Class: Print Prescriptions Sent to Pharmacy Refills  
 amitriptyline (ELAVIL) 50 mg tablet 5 Sig: Take 1 Tab by mouth nightly.   
 Class: Normal  
 Pharmacy: [facility], [address] [facility] of  [address] [facility] Ph #: [phone] Route: Oral  
 amLODIPine (NORVASC) 10 mg tablet 3 Sig: Take 1 Tab by mouth daily. Class: Normal  
 Pharmacy: [facility] [address]  Ph #: [phone] Route: Oral  
 hydroCHLOROthiazide (HYDRODIURIL) 25 mg tablet 3 Sig: Take 1 Tab by mouth daily. Class: Normal  
 Pharmacy: [facility] [address]  Ph #: [phone] Route: Oral  
  
We Performed the Following PAIN MGMT PANEL W/REFL, UR [[id number] Custom] Introducing [facility] SERVICES! [facility] introduces [employer] patient portal. Now you can access parts of your medical record, email your doctor's office, and request medication refills online. 1. In your internet browser, go to [email or web address] 2. Click on the First Time User? Click Here link in the Sign In box. You will see the New Member Sign Up page. 3. Enter your [employer] Access Code exactly as it appears below. You will not need to use this code after youve completed the sign-up process. If you do not sign up before the expiration date, you must request a new code. · [employer] Access Code: [email or web address] Expires: 4/26/2017  1:15 PM 
 
4. Enter the last four digits of your Social Security Number (xxxx) and Date of Birth (mm/dd/yyyy) as indicated and click Submit. You will be taken to the next sign-up page. 5. Create a [employer] ID. This will be your [employer] login ID and cannot be changed, so think of one that is secure and easy to remember. 6. Create a [employer] password. You can change your password at any time. 7. Enter your Password Reset Question and Answer. This can be used at a later time if you forget your password. 8. Enter your e-mail address. You will receive e-mail notification when new information is available in [address] 9. Click Sign Up. You can now view and download portions of your medical record. 10. Click the Download Summary menu link to download a portable copy of your medical information. If you have questions, please visit the Frequently Asked Questions section of the [employer] website. Remember, [employer] is NOT to be used for urgent needs. For medical emergencies, dial 911. Now available from your iPhone and Android! Please provide this summary of care documentation to your next provider. Your primary care clinician is listed as Yesenia Butler. If you have any questions after today's visit, please call [phone].

## 2017-03-30 LAB
AMPHETAMINES UR QL SCN: NEGATIVE NG/ML
BARBITURATES UR QL SCN: NEGATIVE NG/ML
BENZODIAZ UR QL: POSITIVE NG/ML
BZE UR QL SCN: NEGATIVE NG/ML
CANNABINOIDS UR QL CFM: POSITIVE
CREAT UR-MCNC: 19 MG/DL (ref 20–300)
FENTANYL+NORFENTANYL UR QL SCN: NEGATIVE PG/ML
MEPERIDINE UR QL: NEGATIVE NG/ML
METHADONE UR QL CFM: POSITIVE
OPIATES UR QL SCN: POSITIVE NG/ML
OXYCODONE+OXYMORPHONE UR QL SCN: NEGATIVE NG/ML
PCP UR QL: NEGATIVE NG/ML
PH UR: 6.1 [PH] (ref 4.5–8.9)
PLEASE NOTE:, 733157: ABNORMAL
PROPOXYPH UR QL SCN: NEGATIVE NG/ML
SP GR UR: 1
TRAMADOL UR QL SCN: NEGATIVE NG/ML

## 2017-04-17 NOTE — PROGRESS NOTES
3/21/2017    Chief Complaint   Patient presents with    Medication Refill     Elavil, Methadone,Zoloft  and Amlodipine        HPI: Earnest An is a 65 y.o. female. Complicated history of chronic pain, depression, anxiety, seizure disorder. History of ETOH abuse, question of whether seizure activity was a result of ETOH withdrawal. Occurred in [place]. She minimizes use.  states she has cut back although she is drinking every day. She went to [place] and mood improved. Her mental status fluctuates. Presents for refill of her pain medication. Tapering dose. Allergies   Allergen Reactions    Labetalol Diarrhea    Losartan Diarrhea     GI Intolerance         Current Outpatient Prescriptions   Medication Sig Dispense Refill    amitriptyline (ELAVIL) 50 mg tablet Take 1 Tab by mouth nightly. 30 Tab 5    methadone (DOLOPHINE) 5 mg tablet 1.5 tabs po BIDF X 7 days then 1 tab po BID 63 Tab 0    amLODIPine (NORVASC) 10 mg tablet Take 1 Tab by mouth daily. 90 Tab 3    hydroCHLOROthiazide (HYDRODIURIL) 25 mg tablet Take 1 Tab by mouth daily. 90 Tab 3    ALPRAZolam (XANAX) 1 mg tablet TAKE 1 TABLET BY MOUTH THREE TIMES DAILY AS NEEDED ANXIETY. DO NOT TAKE MORE THAN 3MG IN 24 HOURS. 90 Tab 0    nebivolol (BYSTOLIC) 10 mg tablet Take 1 Tab by mouth daily. Indications: hypertension 28 Tab 0    topiramate (TOPAMAX) 50 mg tablet Take 1 Tab by mouth two (2) times a day. 60 Tab 5    fluticasone (FLONASE) 50 mcg/actuation nasal spray 2 Sprays by Both Nostrils route daily. 1 Bottle 2    chlorthalidone (HYGROTEN) 25 mg tablet Take 1 Tab by mouth daily. Indications: HYPERTENSION 30 Tab 5    sertraline (ZOLOFT) 100 mg tablet Take 1 Tab by mouth daily. Indications: ANXIETY WITH DEPRESSION 30 Tab 5    levETIRAcetam 1,000 mg tablet Take 1 Tab by mouth two (2) times a day.  Indications: TONIC-CLONIC EPILEPSY TREATMENT ADJUNCT 60 Tab 5       Past Medical History:   Diagnosis Date    Anxiety     Chronic pain     Depression     Hypertension     Insomnia        Lab Results   Component Value Date/Time    Glucose 96 10/27/2016 11:11 AM    Creatinine 0.83 10/27/2016 11:11 AM       ROS:  Constitutional: No fever, chills or weight loss  Respiratory: No cough, SOB   CV: No chest pain or Palpitations  GI: No nausea, vomiting or diarrhea. : No dysuria or hematuria. Neuro: No headaches, seizures, change in mental status. Physical Exam:   VS Visit Vitals    /80 (BP 1 Location: Left arm, BP Patient Position: Sitting)    Pulse 71    Resp 18    Wt 112 lb 9.6 oz (51.1 kg)    SpO2 98%    BMI 19.33 kg/m2      General  Alert, oriented. NAD. Eyes Conjunctiva and lids normal.    PERRLA, EOMI.   ENMT Mucous membranes moist.    Oropharynx: no erythema, no exudates, no lesions, normal tongue. NECK Thyroid: normal size, nontender. Trachea midline, neck symmetrical and without masses. Carotids 2+ with no bruits. No enlarged nodes. RESP Clear to auscultation and percussion. No rales, wheezes, rhonchi, or rubs. CV RRR, with no S3 or S4, no murmur, no rub. MSKEL Normal gait and station. Normal strength and tone, no atrophy. Back: Full ROM. Non tender. EXT No deformity. Extremities without edema. SKIN Skin warm, normal turgor. NEURO Cranial nerves normal 2-12. No abnormal movement   PSYCH Judgment and insight poor. Oriented to person, place, and time. Affect is alert and attentive. 1. Depression, unspecified depression type  Refill   - amitriptyline (ELAVIL) 50 mg tablet; Take 1 Tab by mouth nightly. Dispense: 30 Tab; Refill: 5    2. Chronic pain syndrome  Refill   Reviewed PDP. Discussed continued taping of dose. - amitriptyline (ELAVIL) 50 mg tablet; Take 1 Tab by mouth nightly. Dispense: 30 Tab; Refill: 5  - methadone (DOLOPHINE) 5 mg tablet; 1.5 tabs po BIDF X 7 days then 1 tab po BID  Dispense: 63 Tab; Refill: 0  - PAIN MGMT PANEL W/REFL, UR    3.  Insomnia, unspecified type  Refill   - amitriptyline (ELAVIL) 50 mg tablet; Take 1 Tab by mouth nightly. Dispense: 30 Tab; Refill: 5    4. Essential hypertension with goal blood pressure less than 130/80  Refill   - amLODIPine (NORVASC) 10 mg tablet; Take 1 Tab by mouth daily. Dispense: 90 Tab; Refill: 3        Orders Placed This Encounter    PAIN MGMT PANEL W/REFL, UR    amitriptyline (ELAVIL) 50 mg tablet     Sig: Take 1 Tab by mouth nightly. Dispense:  30 Tab     Refill:  5    methadone (DOLOPHINE) 5 mg tablet     Si.5 tabs po BIDF X 7 days then 1 tab po BID     Dispense:  63 Tab     Refill:  0    amLODIPine (NORVASC) 10 mg tablet     Sig: Take 1 Tab by mouth daily. Dispense:  90 Tab     Refill:  3    hydroCHLOROthiazide (HYDRODIURIL) 25 mg tablet     Sig: Take 1 Tab by mouth daily. Dispense:  90 Tab     Refill:  3       Follow-up Disposition:  Return in about 1 month (around 2017).         JEREMY Rea

## 2017-04-25 ENCOUNTER — OFFICE VISIT (OUTPATIENT)
Dept: FAMILY MEDICINE CLINIC | Age: 57
End: 2017-04-25

## 2017-04-25 VITALS
WEIGHT: 106 LBS | DIASTOLIC BLOOD PRESSURE: 74 MMHG | HEIGHT: 64 IN | SYSTOLIC BLOOD PRESSURE: 132 MMHG | RESPIRATION RATE: 19 BRPM | BODY MASS INDEX: 18.1 KG/M2

## 2017-04-25 DIAGNOSIS — F41.9 ANXIETY: ICD-10-CM

## 2017-04-25 DIAGNOSIS — G89.4 CHRONIC PAIN SYNDROME: ICD-10-CM

## 2017-04-25 DIAGNOSIS — F33.1 MODERATE EPISODE OF RECURRENT MAJOR DEPRESSIVE DISORDER (HCC): ICD-10-CM

## 2017-04-25 RX ORDER — ALPRAZOLAM 1 MG/1
TABLET ORAL
Qty: 90 TAB | Refills: 0 | Status: SHIPPED | OUTPATIENT
Start: 2017-04-25 | End: 2017-05-23 | Stop reason: SDUPTHER

## 2017-04-25 RX ORDER — METHADONE HYDROCHLORIDE 5 MG/1
TABLET ORAL
Qty: 50 TAB | Refills: 0 | Status: SHIPPED | OUTPATIENT
Start: 2017-04-25 | End: 2017-10-12 | Stop reason: ALTCHOICE

## 2017-04-25 NOTE — MR AVS SNAPSHOT
Visit Information Date & Time Provider Department Dept. Phone Encounter #  
 4/25/2017  1:30 PM Jax Del Cid NP [facility] [id number] Your Appointments 6/6/2017  1:30 PM  
ESTABLISHED PATIENT with Jax Del Cid NP  
Giulia Melvin ([address]) Appt Note: 6 wk F/U  
 [address] [facility] [address] [address] [phone]  
  
   
 [address] [facility] [address] [facility],[address] [address] Upcoming Health Maintenance Date Due Hepatitis C Screening 1960 Pneumococcal 19-64 Medium Risk (1 of 1 - PPSV23) 4/8/1980 DTaP/Tdap/Td series (1 - Tdap) 4/8/1982 PAP AKA CERVICAL CYTOLOGY 4/8/1982 BREAST CANCER SCRN MAMMOGRAM 4/8/2011 FOBT Q 1 YEAR AGE 50-75 4/8/2011 Allergies as of 4/25/2017  Review Complete On: 4/25/2017 By: Jax Del Cid NP Severity Noted Reaction Type Reactions Labetalol Medium 06/07/2016    Diarrhea Losartan Medium 02/11/2016    Diarrhea GI Intolerance Current Immunizations  Reviewed on 4/25/2017 No immunizations on file. Reviewed by Jarred Parham on 4/25/2017 at  1:39 PM  
You Were Diagnosed With   
  
 Codes Comments Moderate episode of recurrent major depressive disorder (HCC)     ICD-10-CM: F33.1 ICD-9-CM: 296.32 Anxiety     ICD-10-CM: F41.9 ICD-9-CM: 300.00 Chronic pain syndrome     ICD-10-CM: G89.4 ICD-9-CM: 338. 4 Vitals BP Resp Height(growth percentile) Weight(growth percentile) BMI OB Status 132/74 (BP 1 Location: Right arm, BP Patient Position: Sitting) 19 5' 4\" (1.626 m) 106 lb (48.1 kg) 18.19 kg/m2 Menopause Smoking Status Current Every Day Smoker Vitals History BMI and BSA Data Body Mass Index Body Surface Area  
 18.19 kg/m 2 1.47 m 2 Preferred Pharmacy Pharmacy Name Phone  [address], [address] [facility] AT [facility] OF  3 & S[address] YARA [place] [phone] Your Updated Medication List  
  
   
This list is accurate as of: 17  2:45 PM.  Always use your most recent med list.  
  
  
  
  
 ALPRAZolam 1 mg tablet Commonly known as:  XANAX  
TAKE 1 TABLET BY MOUTH THREE TIMES DAILY AS NEEDED ANXIETY. DO NOT TAKE MORE THAN 3MG IN 24 HOURS. amitriptyline 50 mg tablet Commonly known as:  ELAVIL Take 1 Tab by mouth nightly. amLODIPine 10 mg tablet Commonly known as:  Thom Juan Take 1 Tab by mouth daily. chlorthalidone 25 mg tablet Commonly known as:  Nichole Piotr Take 1 Tab by mouth daily. Indications: HYPERTENSION  
  
 fluticasone 50 mcg/actuation nasal spray Commonly known as:  Wilbern Olayinka 2 Sprays by Both Nostrils route daily. hydroCHLOROthiazide 25 mg tablet Commonly known as:  HYDRODIURIL Take 1 Tab by mouth daily. levETIRAcetam 1,000 mg tablet Take 1 Tab by mouth two (2) times a day. Indications: TONIC-CLONIC EPILEPSY TREATMENT ADJUNCT  
  
 methadone 5 mg tablet Commonly known as:  DOLOPHINE  
1 tab po AM; 1/2 tab po PM  
  
 nebivolol 10 mg tablet Commonly known as:  BYSTOLIC Take 1 Tab by mouth daily. Indications: hypertension  
  
 sertraline 100 mg tablet Commonly known as:  ZOLOFT Take 1 Tab by mouth daily. Indications: ANXIETY WITH DEPRESSION  
  
 topiramate 50 mg tablet Commonly known as:  TOPAMAX Take 1 Tab by mouth two (2) times a day. Prescriptions Printed Refills ALPRAZolam (XANAX) 1 mg tablet 0 Sig: TAKE 1 TABLET BY MOUTH THREE TIMES DAILY AS NEEDED ANXIETY. DO NOT TAKE MORE THAN 3MG IN 24 HOURS. Class: Print  
 methadone (DOLOPHINE) 5 mg tablet 0 Si tab po AM; 1/2 tab po PM  
 Class: Print Introducing [facility] & HEALTH SERVICES! Killian Kam introduces [employer] patient portal. Now you can access parts of your medical record, email your doctor's office, and request medication refills online. 1. In your internet browser, go to [email or web address] 2. Click on the First Time User? Click Here link in the Sign In box. You will see the New Member Sign Up page. 3. Enter your [employer] Access Code exactly as it appears below. You will not need to use this code after youve completed the sign-up process. If you do not sign up before the expiration date, you must request a new code. · HERMEL [employer] Access Code: [email or web address] Expires: 4/26/2017  1:15 PM 
 
4. Enter the last four digits of your Social Security Number (xxxx) and Date of Birth (mm/dd/yyyy) as indicated and click Submit. You will be taken to the next sign-up page. 5. Create a WorldWide Biggiest ID. This will be your HERMEL DELOR login ID and cannot be changed, so think of one that is secure and easy to remember. 6. Create a HERMEL DELOR password. You can change your password at any time. 7. Enter your Password Reset Question and Answer. This can be used at a later time if you forget your password. 8. Enter your e-mail address. You will receive e-mail notification when new information is available in [address] 9. Click Sign Up. You can now view and download portions of your medical record. 10. Click the Download Summary menu link to download a portable copy of your medical information. If you have questions, please visit the Frequently Asked Questions section of the HERM[employer] website. Remember, [employer] is NOT to be used for urgent needs. For medical emergencies, dial 911. Now available from your iPhone and Android! Please provide this summary of care documentation to your next provider. Your primary care clinician is listed as Siria Alvarado. If you have any questions after today's visit, please call [phone].

## 2017-05-23 ENCOUNTER — OFFICE VISIT (OUTPATIENT)
Dept: FAMILY MEDICINE CLINIC | Age: 57
End: 2017-05-23

## 2017-05-23 VITALS
HEIGHT: 64 IN | DIASTOLIC BLOOD PRESSURE: 120 MMHG | RESPIRATION RATE: 16 BRPM | OXYGEN SATURATION: 100 % | SYSTOLIC BLOOD PRESSURE: 222 MMHG | TEMPERATURE: 99 F | BODY MASS INDEX: 18.44 KG/M2 | WEIGHT: 108 LBS | HEART RATE: 101 BPM

## 2017-05-23 DIAGNOSIS — R23.2 FLUSHING: ICD-10-CM

## 2017-05-23 DIAGNOSIS — Z86.19 HISTORY OF POSITIVE HEPATITIS C: ICD-10-CM

## 2017-05-23 DIAGNOSIS — I10 ESSENTIAL HYPERTENSION WITH GOAL BLOOD PRESSURE LESS THAN 130/80: Primary | ICD-10-CM

## 2017-05-23 DIAGNOSIS — F41.9 ANXIETY: ICD-10-CM

## 2017-05-23 DIAGNOSIS — R11.0 NAUSEA: ICD-10-CM

## 2017-05-23 DIAGNOSIS — F19.20 ADDICTION TO DRUG (HCC): ICD-10-CM

## 2017-05-23 DIAGNOSIS — I10 HYPERTENSION, ACCELERATED: ICD-10-CM

## 2017-05-23 DIAGNOSIS — R00.0 TACHYCARDIA: ICD-10-CM

## 2017-05-23 DIAGNOSIS — G89.4 CHRONIC PAIN SYNDROME: ICD-10-CM

## 2017-05-23 DIAGNOSIS — F33.1 MODERATE EPISODE OF RECURRENT MAJOR DEPRESSIVE DISORDER (HCC): ICD-10-CM

## 2017-05-23 RX ORDER — PROMETHAZINE HYDROCHLORIDE 25 MG/1
TABLET ORAL
Refills: 0 | COMMUNITY
Start: 2017-04-25 | End: 2017-12-08 | Stop reason: SDUPTHER

## 2017-05-23 RX ORDER — METHADONE HYDROCHLORIDE 5 MG/1
TABLET ORAL
Qty: 45 TAB | Refills: 0 | Status: SHIPPED | OUTPATIENT
Start: 2017-05-23 | End: 2017-06-20 | Stop reason: SDUPTHER

## 2017-05-23 RX ORDER — ALPRAZOLAM 1 MG/1
TABLET ORAL
Qty: 90 TAB | Refills: 0 | Status: SHIPPED | OUTPATIENT
Start: 2017-05-23 | End: 2017-06-20 | Stop reason: SDUPTHER

## 2017-05-23 NOTE — MR AVS SNAPSHOT
Visit Information Date & Time Provider Department Dept. Phone Encounter #  
 5/23/2017  7:30 AM Julián Gayle  [address] [facility] [id number] Upcoming Health Maintenance Date Due Hepatitis C Screening 1960 Pneumococcal 19-64 Medium Risk (1 of 1 - PPSV23) 4/8/1980 DTaP/Tdap/Td series (1 - Tdap) 4/8/1982 PAP AKA CERVICAL CYTOLOGY 4/8/1982 BREAST CANCER SCRN MAMMOGRAM 4/8/2011 FOBT Q 1 YEAR AGE 50-75 4/8/2011 INFLUENZA AGE 9 TO ADULT 8/1/2017 Allergies as of 5/23/2017  Review Complete On: 5/23/2017 By: Julián Gayle NP Severity Noted Reaction Type Reactions Labetalol Medium 06/07/2016    Diarrhea Losartan Medium 02/11/2016    Diarrhea GI Intolerance Current Immunizations  Reviewed on 4/25/2017 No immunizations on file. Not reviewed this visit You Were Diagnosed With   
  
 Codes Comments Essential hypertension with goal blood pressure less than 130/80    -  Primary ICD-10-CM: I10 
ICD-9-CM: 401.9 Hypertension, accelerated     ICD-10-CM: I10 
ICD-9-CM: 401.0 History of positive hepatitis C     ICD-10-CM: Z86.19 ICD-9-CM: V12.09 Addiction to drug [facility])     ICD-10-CM: G02.66 ICD-9-CM: 304.90 Flushing     ICD-10-CM: R23.2 ICD-9-CM: 782.62 Nausea     ICD-10-CM: R11.0 ICD-9-CM: 787.02 Moderate episode of recurrent major depressive disorder (HCC)     ICD-10-CM: F33.1 ICD-9-CM: 296.32 Anxiety     ICD-10-CM: F41.9 ICD-9-CM: 300.00 Tachycardia     ICD-10-CM: R00.0 ICD-9-CM: 797. 0 Chronic pain syndrome     ICD-10-CM: G89.4 ICD-9-CM: 338. 4 Vitals BP Pulse Temp Resp Height(growth percentile) Weight(growth percentile) (!) 222/120 (BP 1 Location: Right arm, BP Patient Position: Sitting) (!) 101 99 °F (37.2 °C) (Oral) 16 5' 4\" (1.626 m) 108 lb (49 kg) SpO2 BMI OB Status Smoking Status 100% 18.54 kg/m2 Menopause Current Every Day Smoker BMI and BSA Data Body Mass Index Body Surface Area 18.54 kg/m 2 1.49 m 2 Preferred Pharmacy Pharmacy Name Phone [address], [address] AT [facility] OF  3 & [address] Y[address] [place] [phone] Your Updated Medication List  
  
   
This list is accurate as of: 5/23/17  8:42 AM.  Always use your most recent med list.  
  
  
  
  
 ALPRAZolam 1 mg tablet Commonly known as:  XANAX  
TAKE 1 TABLET BY MOUTH THREE TIMES DAILY AS NEEDED ANXIETY. DO NOT TAKE MORE THAN 3MG IN 24 HOURS. * amitriptyline 50 mg tablet Commonly known as:  ELAVIL Take 1 Tab by mouth nightly. * amitriptyline 25 mg tablet Commonly known as:  ELAVIL TAKE 1 TABLET BY MOUTH EVERY NIGHT FOR NEUROPATHIC PAIN  
  
 amLODIPine 10 mg tablet Commonly known as:  Minesh Garland Take 1 Tab by mouth daily. chlorthalidone 25 mg tablet Commonly known as:  Maciej Domenico Take 1 Tab by mouth daily. Indications: HYPERTENSION  
  
 fluticasone 50 mcg/actuation nasal spray Commonly known as:  Lynnwood Mckeon 2 Sprays by Both Nostrils route daily. hydroCHLOROthiazide 25 mg tablet Commonly known as:  HYDRODIURIL Take 1 Tab by mouth daily. levETIRAcetam 1,000 mg tablet Take 1 Tab by mouth two (2) times a day. Indications: TONIC-CLONIC EPILEPSY TREATMENT ADJUNCT  
  
 * methadone 5 mg tablet Commonly known as:  DOLOPHINE  
1 tab po AM; 1/2 tab po PM  
  
 * methadone 5 mg tablet Commonly known as:  DOLOPHINE  
! Tab q am   1/2 tab q pm  
  
 nebivolol 10 mg tablet Commonly known as:  BYSTOLIC Take 1 Tab by mouth daily. Indications: hypertension  
  
 promethazine 25 mg tablet Commonly known as:  PHENERGAN  
TK 1 T PO  Q 8 H PRN  
  
 sertraline 100 mg tablet Commonly known as:  ZOLOFT Take 1 Tab by mouth daily. Indications: ANXIETY WITH DEPRESSION  
  
 topiramate 50 mg tablet Commonly known as:  TOPAMAX Take 1 Tab by mouth two (2) times a day. * Notice: This list has 4 medication(s) that are the same as other medications prescribed for you. Read the directions carefully, and ask your doctor or other care provider to review them with you. Prescriptions Printed Refills  
 methadone (DOLOPHINE) 5 mg tablet 0 Sig: ! Tab q am   1/2 tab q pm  
 Class: Print ALPRAZolam (XANAX) 1 mg tablet 0 Sig: TAKE 1 TABLET BY MOUTH THREE TIMES DAILY AS NEEDED ANXIETY. DO NOT TAKE MORE THAN 3MG IN 24 HOURS. Class: Print We Performed the Following 5-HIAA, UR [id number] CPT(R)] Mattie Vazquez URINE [id number] CPT(R)] CBC WITH AUTOMATED DIFF [79556 CPT(R)] METABOLIC PANEL, COMPREHENSIVE [27514 CPT(R)] WA COLLECTION VENOUS BLOOD,VENIPUNCTURE [id number] CPT(R)] THYROID PANEL W/TSH [38402 CPT(R)] Introducing \[facility]\"" & HEALTH SERVICES! [facility] introduces [employer] patient portal. Now you can access parts of your medical record, email your doctor's office, and request medication refills online. 1. In your internet browser, go to [email or web address] 2. Click on the First Time User? Click Here link in the Sign In box. You will see the New Member Sign Up page. 3. Enter your [employer] Access Code exactly as it appears below. You will not need to use this code after youve completed the sign-up process. If you do not sign up before the expiration date, you must request a new code. · [employer] Access Code: [email or web address] Expires: 8/21/2017  8:33 AM 
 
4. Enter the last four digits of your Social Security Number (xxxx) and Date of Birth (mm/dd/yyyy) as indicated and click Submit. You will be taken to the next sign-up page. 5. Create a Huayue [employer] ID. This will be your [employer] login ID and cannot be changed, so think of one that is secure and easy to remember. 6. Create a Hu[employer]ue [employer] password. You can change your password at any time. 7. Enter your Password Reset Question and Answer.  This can be used at a later time if you forget your password. 8. Enter your e-mail address. You will receive e-mail notification when new information is available in [address] 9. Click Sign Up. You can now view and download portions of your medical record. 10. Click the Download Summary menu link to download a portable copy of your medical information. If you have questions, please visit the Frequently Asked Questions section of the [employer] website. Remember, [employer] is NOT to be used for urgent needs. For medical emergencies, dial 911. Now available from your iPhone and Android! Please provide this summary of care documentation to your next provider. Your primary care clinician is listed as Jax Del Cid. If you have any questions after today's visit, please call [phone].

## 2017-05-23 NOTE — PROGRESS NOTES
5/23/2017    Chief Complaint   Patient presents with    Medication Refill       HPI: Compa Lin is a 65 y.o. female. Disabled WF on the basis of chronic back pain and psych history. States she has been feeling pretty well. BP significantly elevated. Has not had Amlodipine X 2 days. Took Bystolic     Weaning narcotic pain medicaitions. Current Methadone dose 5mg 1tab am 1/2 tab pm.  Tolerating well. Presents for medication refill. Headache this am.   Has been resting. Headache is better than it was. No seizure activity. Allergies   Allergen Reactions    Labetalol Diarrhea    Losartan Diarrhea     GI Intolerance         Current Outpatient Prescriptions   Medication Sig Dispense Refill    promethazine (PHENERGAN) 25 mg tablet TK 1 T PO  Q 8 H PRN  0    methadone (DOLOPHINE) 5 mg tablet ! Tab q am   1/2 tab q pm 45 Tab 0    ALPRAZolam (XANAX) 1 mg tablet TAKE 1 TABLET BY MOUTH THREE TIMES DAILY AS NEEDED ANXIETY. DO NOT TAKE MORE THAN 3MG IN 24 HOURS. 90 Tab 0    amitriptyline (ELAVIL) 25 mg tablet TAKE 1 TABLET BY MOUTH EVERY NIGHT FOR NEUROPATHIC PAIN 30 Tab 5    methadone (DOLOPHINE) 5 mg tablet 1 tab po AM; 1/2 tab po PM 50 Tab 0    amLODIPine (NORVASC) 10 mg tablet Take 1 Tab by mouth daily. 90 Tab 3    hydroCHLOROthiazide (HYDRODIURIL) 25 mg tablet Take 1 Tab by mouth daily. 90 Tab 3    nebivolol (BYSTOLIC) 10 mg tablet Take 1 Tab by mouth daily. Indications: hypertension 28 Tab 0    topiramate (TOPAMAX) 50 mg tablet Take 1 Tab by mouth two (2) times a day. 60 Tab 5    fluticasone (FLONASE) 50 mcg/actuation nasal spray 2 Sprays by Both Nostrils route daily. 1 Bottle 2    chlorthalidone (HYGROTEN) 25 mg tablet Take 1 Tab by mouth daily. Indications: HYPERTENSION 30 Tab 5    sertraline (ZOLOFT) 100 mg tablet Take 1 Tab by mouth daily. Indications: ANXIETY WITH DEPRESSION 30 Tab 5    levETIRAcetam 1,000 mg tablet Take 1 Tab by mouth two (2) times a day.  Indications: TONIC-CLONIC EPILEPSY TREATMENT ADJUNCT 60 Tab 5    amitriptyline (ELAVIL) 50 mg tablet Take 1 Tab by mouth nightly. 30 Tab 5       Past Medical History:   Diagnosis Date    Anxiety     Chronic pain     Depression     Hypertension     Insomnia        Lab Results   Component Value Date/Time    Glucose 96 10/27/2016 11:11 AM    Creatinine 0.83 10/27/2016 11:11 AM       ROS:  Constitutional: No fever, chills or weight loss  Respiratory: No cough, SOB   CV: No chest pain or Palpitations  GI: No nausea, vomiting or diarrhea. : No dysuria or hematuria. Neuro: No headaches, seizures, change in mental status. Physical Exam:   VS Visit Vitals    BP (!) 222/120 (BP 1 Location: Right arm, BP Patient Position: Sitting)    Pulse (!) 101    Temp 99 °F (37.2 °C) (Oral)    Resp 16    Ht 5' 4\" (1.626 m)    Wt 108 lb (49 kg)    SpO2 100%    BMI 18.54 kg/m2      General  Alert, oriented. NAD. Eyes Conjunctiva and lids normal.    PERRLA, EOMI.   ENMT Mucous membranes moist.    Oropharynx: no erythema, no exudates, no lesions, normal tongue. NECK Thyroid: normal size, nontender. Trachea midline, neck symmetrical and without masses. Carotids 2+ with no bruits. No enlarged nodes. RESP Clear to auscultation and percussion. No rales, wheezes, rhonchi, or rubs. CV RRR, with no S3 or S4, no murmur, no rub. MSKEL Normal gait and station. Normal strength and tone, no atrophy. EXT No deformity. Extremities without edema. Valaria Ding SKIN Skin warm, normal turgor. NEURO Cranial nerves normal 2-12. PSYCH Judgment and insight good. Oriented to person, place, and time. Affect is alert and attentive. 1. Essential hypertension with goal blood pressure less than 130/80  Labile BP. Evaluate for Pheochrome. Consider Carcinoid with flushing.   - CBC WITH AUTOMATED DIFF  - METABOLIC PANEL, COMPREHENSIVE  - 5-HIAA, UR    2.  Hypertension, accelerated  Check labs  Needs to get meds and take as prescribed  - CBC WITH AUTOMATED DIFF  - METABOLIC PANEL, COMPREHENSIVE  - CATECHOLAMINE+VMA, 24-HR URINE  - 5-HIAA, UR    3. History of positive hepatitis C  Need to check viral load. - CBC WITH AUTOMATED DIFF  - METABOLIC PANEL, COMPREHENSIVE    4. Addiction to drug ([facility] [address]  Reviewed PDP    - CBC WITH AUTOMATED DIFF  - METABOLIC PANEL, COMPREHENSIVE    5. Flushing  Check labs. Will check 5-HIAA  - CBC WITH AUTOMATED DIFF  - METABOLIC PANEL, COMPREHENSIVE  - CATECHOLAMINE+VMA, 24-HR URINE  - 5-HIAA, UR    6. Nausea  Check labs   - CBC WITH AUTOMATED DIFF  - METABOLIC PANEL, COMPREHENSIVE  - 5-HIAA, UR    7. Moderate episode of recurrent major depressive disorder (HCC)  Continue current regimen.   - CBC WITH AUTOMATED DIFF  - METABOLIC PANEL, COMPREHENSIVE  - ALPRAZolam (XANAX) 1 mg tablet; TAKE 1 TABLET BY MOUTH THREE TIMES DAILY AS NEEDED ANXIETY. DO NOT TAKE MORE THAN 3MG IN 24 HOURS. Dispense: 90 Tab; Refill: 0    8. Anxiety  Refill   - CBC WITH AUTOMATED DIFF  - METABOLIC PANEL, COMPREHENSIVE  - ALPRAZolam (XANAX) 1 mg tablet; TAKE 1 TABLET BY MOUTH THREE TIMES DAILY AS NEEDED ANXIETY. DO NOT TAKE MORE THAN 3MG IN 24 HOURS. Dispense: 90 Tab; Refill: 0    9. Tachycardia  Check labs   - THYROID PANEL W/TSH  - CATECHOLAMINE+VMA, 24-HR URINE  - 5-HIAA, UR    10. Chronic pain syndrome  Refill. Continue current dose and taper to 1/2 tab BID next month. - methadone (DOLOPHINE) 5 mg tablet; ! Tab q am   1/2 tab q pm  Dispense: 45 Tab; Refill: 0    Orders Placed This Encounter    CBC WITH AUTOMATED DIFF    METABOLIC PANEL, COMPREHENSIVE    THYROID PANEL W/TSH    CATECHOLAMINE+VMA, 24-HR URINE    5-HIAA, UR    promethazine (PHENERGAN) 25 mg tablet     Sig: TK 1 T PO  Q 8 H PRN     Refill:  0    methadone (DOLOPHINE) 5 mg tablet     Sig: ! Tab q am   1/2 tab q pm     Dispense:  45 Tab     Refill:  0    ALPRAZolam (XANAX) 1 mg tablet     Sig: TAKE 1 TABLET BY MOUTH THREE TIMES DAILY AS NEEDED ANXIETY.  DO NOT TAKE MORE THAN 3MG IN 24 HOURS. Dispense:  90 Tab     Refill:  0       Follow-up Disposition:  Return in about 1 month (around 6/23/2017).         Viridiana Means, JEREMY

## 2017-05-24 LAB
ALBUMIN SERPL-MCNC: 4.9 G/DL (ref 3.5–5.5)
ALBUMIN/GLOB SERPL: 1.7 {RATIO} (ref 1.2–2.2)
ALP SERPL-CCNC: 135 IU/L (ref 39–117)
ALT SERPL-CCNC: 105 IU/L (ref 0–32)
AST SERPL-CCNC: 154 IU/L (ref 0–40)
BASOPHILS # BLD AUTO: 0 X10E3/UL (ref 0–0.2)
BASOPHILS NFR BLD AUTO: 0 %
BILIRUB SERPL-MCNC: 0.4 MG/DL (ref 0–1.2)
BUN SERPL-MCNC: 7 MG/DL (ref 6–24)
BUN/CREAT SERPL: 13 (ref 9–23)
CALCIUM SERPL-MCNC: 10 MG/DL (ref 8.7–10.2)
CHLORIDE SERPL-SCNC: 88 MMOL/L (ref 96–106)
CO2 SERPL-SCNC: 27 MMOL/L (ref 18–29)
CREAT SERPL-MCNC: 0.56 MG/DL (ref 0.57–1)
EOSINOPHIL # BLD AUTO: 0.2 X10E3/UL (ref 0–0.4)
EOSINOPHIL NFR BLD AUTO: 3 %
ERYTHROCYTE [DISTWIDTH] IN BLOOD BY AUTOMATED COUNT: 14.6 % (ref 12.3–15.4)
FT4I SERPL CALC-MCNC: 2.2 (ref 1.2–4.9)
GLOBULIN SER CALC-MCNC: 2.9 G/DL (ref 1.5–4.5)
GLUCOSE SERPL-MCNC: 106 MG/DL (ref 65–99)
HCT VFR BLD AUTO: 42.9 % (ref 34–46.6)
HGB BLD-MCNC: 14.5 G/DL (ref 11.1–15.9)
IMM GRANULOCYTES # BLD: 0 X10E3/UL (ref 0–0.1)
IMM GRANULOCYTES NFR BLD: 0 %
LYMPHOCYTES # BLD AUTO: 1.7 X10E3/UL (ref 0.7–3.1)
LYMPHOCYTES NFR BLD AUTO: 25 %
MCH RBC QN AUTO: 31.7 PG (ref 26.6–33)
MCHC RBC AUTO-ENTMCNC: 33.8 G/DL (ref 31.5–35.7)
MCV RBC AUTO: 94 FL (ref 79–97)
MONOCYTES # BLD AUTO: 0.4 X10E3/UL (ref 0.1–0.9)
MONOCYTES NFR BLD AUTO: 6 %
NEUTROPHILS # BLD AUTO: 4.6 X10E3/UL (ref 1.4–7)
NEUTROPHILS NFR BLD AUTO: 66 %
PLATELET # BLD AUTO: 260 X10E3/UL (ref 150–379)
POTASSIUM SERPL-SCNC: 4.6 MMOL/L (ref 3.5–5.2)
PROT SERPL-MCNC: 7.8 G/DL (ref 6–8.5)
RBC # BLD AUTO: 4.57 X10E6/UL (ref 3.77–5.28)
SODIUM SERPL-SCNC: 135 MMOL/L (ref 134–144)
T3RU NFR SERPL: 32 % (ref 24–39)
T4 SERPL-MCNC: 7 UG/DL (ref 4.5–12)
TSH SERPL DL<=0.005 MIU/L-ACNC: 0.29 UIU/ML (ref 0.45–4.5)
WBC # BLD AUTO: 6.9 X10E3/UL (ref 3.4–10.8)

## 2017-05-25 DIAGNOSIS — I10 ESSENTIAL HYPERTENSION WITH GOAL BLOOD PRESSURE LESS THAN 130/80: ICD-10-CM

## 2017-05-25 RX ORDER — AMLODIPINE BESYLATE 10 MG/1
10 TABLET ORAL DAILY
Qty: 90 TAB | Refills: 3 | Status: SHIPPED | OUTPATIENT
Start: 2017-05-25 | End: 2017-06-01 | Stop reason: SDUPTHER

## 2017-05-25 RX ORDER — NEBIVOLOL 10 MG/1
10 TABLET ORAL DAILY
Qty: 30 TAB | Refills: 3 | Status: SHIPPED | OUTPATIENT
Start: 2017-05-25 | End: 2017-12-08 | Stop reason: SDUPTHER

## 2017-06-01 DIAGNOSIS — I10 ESSENTIAL HYPERTENSION WITH GOAL BLOOD PRESSURE LESS THAN 130/80: ICD-10-CM

## 2017-06-01 RX ORDER — AMLODIPINE BESYLATE 10 MG/1
10 TABLET ORAL DAILY
Qty: 90 TAB | Refills: 3 | OUTPATIENT
Start: 2017-06-01 | End: 2018-01-01 | Stop reason: SDUPTHER

## 2017-06-20 ENCOUNTER — OFFICE VISIT (OUTPATIENT)
Dept: FAMILY MEDICINE CLINIC | Age: 57
End: 2017-06-20

## 2017-06-20 VITALS
HEART RATE: 82 BPM | RESPIRATION RATE: 18 BRPM | HEIGHT: 64 IN | OXYGEN SATURATION: 98 % | WEIGHT: 113.6 LBS | DIASTOLIC BLOOD PRESSURE: 78 MMHG | SYSTOLIC BLOOD PRESSURE: 142 MMHG | BODY MASS INDEX: 19.39 KG/M2

## 2017-06-20 DIAGNOSIS — F33.1 MODERATE EPISODE OF RECURRENT MAJOR DEPRESSIVE DISORDER (HCC): ICD-10-CM

## 2017-06-20 DIAGNOSIS — G89.4 CHRONIC PAIN SYNDROME: ICD-10-CM

## 2017-06-20 DIAGNOSIS — F41.9 ANXIETY: ICD-10-CM

## 2017-06-20 RX ORDER — METHADONE HYDROCHLORIDE 5 MG/1
TABLET ORAL
Qty: 45 TAB | Refills: 0 | Status: SHIPPED | OUTPATIENT
Start: 2017-06-20 | End: 2017-07-24 | Stop reason: SDUPTHER

## 2017-06-20 RX ORDER — ALPRAZOLAM 1 MG/1
TABLET ORAL
Qty: 90 TAB | Refills: 0 | Status: SHIPPED | OUTPATIENT
Start: 2017-06-20 | End: 2017-07-24 | Stop reason: SDUPTHER

## 2017-06-20 NOTE — MR AVS SNAPSHOT
Visit Information Date & Time Provider Department Dept. Phone Encounter #  
 6/20/2017 11:00 AM Kennedi Irvin NP [address] [facility] [id number] Upcoming Health Maintenance Date Due Hepatitis C Screening 1960 Pneumococcal 19-64 Medium Risk (1 of 1 - PPSV23) 4/8/1980 DTaP/Tdap/Td series (1 - Tdap) 4/8/1982 PAP AKA CERVICAL CYTOLOGY 4/8/1982 BREAST CANCER SCRN MAMMOGRAM 4/8/2011 FOBT Q 1 YEAR AGE 50-75 4/8/2011 INFLUENZA AGE 9 TO ADULT 8/1/2017 Allergies as of 6/20/2017  Review Complete On: 6/20/2017 By: Roslyn Bazan Severity Noted Reaction Type Reactions Labetalol Medium 06/07/2016    Diarrhea Losartan Medium 02/11/2016    Diarrhea GI Intolerance Current Immunizations  Reviewed on 6/20/2017 No immunizations on file. Reviewed by Roslyn Bazan on 6/20/2017 at 11:13 AM  
You Were Diagnosed With   
  
 Codes Comments Moderate episode of recurrent major depressive disorder (HCC)     ICD-10-CM: F33.1 ICD-9-CM: 296.32 Anxiety     ICD-10-CM: F41.9 ICD-9-CM: 300.00 Chronic pain syndrome     ICD-10-CM: G89.4 ICD-9-CM: 338. 4 Vitals BP Pulse Resp Height(growth percentile) Weight(growth percentile) SpO2  
 142/78 (BP 1 Location: Right arm, BP Patient Position: Sitting) 82 18 5' 4\" (1.626 m) 113 lb 9.6 oz (51.5 kg) 98% BMI OB Status Smoking Status 19.5 kg/m2 Menopause Current Every Day Smoker Vitals History BMI and BSA Data Body Mass Index Body Surface Area 19.5 kg/m 2 1.52 m 2 Preferred Pharmacy Pharmacy Name Phone [address], [address] AT [facility] OF  3 & SABIHA Poole [phone] Your Updated Medication List  
  
   
This list is accurate as of: 6/20/17  1:09 PM.  Always use your most recent med list.  
  
  
  
  
 ALPRAZolam 1 mg tablet Commonly known as:  Carlos A Inch TAKE 1 TABLET BY MOUTH THREE TIMES DAILY AS NEEDED ANXIETY. DO NOT TAKE MORE THAN 3MG IN 24 HOURS. amitriptyline 50 mg tablet Commonly known as:  ELAVIL Take 1 Tab by mouth nightly. amLODIPine 10 mg tablet Commonly known as:  Saumya Acosta Take 1 Tab by mouth daily. chlorthalidone 25 mg tablet Commonly known as:  Rianna Greenberg Take 1 Tab by mouth daily. Indications: HYPERTENSION  
  
 fluticasone 50 mcg/actuation nasal spray Commonly known as:  Bowman Mayra 2 Sprays by Both Nostrils route daily. hydroCHLOROthiazide 25 mg tablet Commonly known as:  HYDRODIURIL Take 1 Tab by mouth daily. levETIRAcetam 1,000 mg tablet Take 1 Tab by mouth two (2) times a day. Indications: TONIC-CLONIC EPILEPSY TREATMENT ADJUNCT  
  
 * methadone 5 mg tablet Commonly known as:  DOLOPHINE  
1 tab po AM; 1/2 tab po PM  
  
 * methadone 5 mg tablet Commonly known as:  DOLOPHINE  
! Tab q am   1/2 tab q pm  
  
 nebivolol 10 mg tablet Commonly known as:  BYSTOLIC Take 1 Tab by mouth daily. Indications: hypertension  
  
 promethazine 25 mg tablet Commonly known as:  PHENERGAN  
TK 1 T PO  Q 8 H PRN  
  
 sertraline 100 mg tablet Commonly known as:  ZOLOFT Take 1 Tab by mouth daily. Indications: ANXIETY WITH DEPRESSION  
  
 topiramate 50 mg tablet Commonly known as:  TOPAMAX Take 1 Tab by mouth two (2) times a day. * Notice: This list has 2 medication(s) that are the same as other medications prescribed for you. Read the directions carefully, and ask your doctor or other care provider to review them with you. Prescriptions Printed Refills ALPRAZolam (XANAX) 1 mg tablet 0 Sig: TAKE 1 TABLET BY MOUTH THREE TIMES DAILY AS NEEDED ANXIETY. DO NOT TAKE MORE THAN 3MG IN 24 HOURS. Class: Print  
 methadone (DOLOPHINE) 5 mg tablet 0 Sig: ! Tab q am   1/2 tab q pm  
 Class: Print Introducing [facility] SERVICES! Christopher Bunn introduces [employer] patient portal. Now you can access parts of your medical record, email your doctor's office, and request medication refills online. 1. In your internet browser, go to [email or web address] 2. Click on the First Time User? Click Here link in the Sign In box. You will see the New Member Sign Up page. 3. Enter your [employer] Access Code exactly as it appears below. You will not need to use this code after youve completed the sign-up process. If you do not sign up before the expiration date, you must request a new code. · [employer] Access Code: [email or web address] Expires: 8/21/2017  8:33 AM 
 
4. Enter the last four digits of your Social Security Number (xxxx) and Date of Birth (mm/dd/yyyy) as indicated and click Submit. You will be taken to the next sign-up page. 5. Create a [employer] ID. This will be your [employer] login ID and cannot be changed, so think of one that is secure and easy to remember. 6. Create a [employer] password. You can change your password at any time. 7. Enter your Password Reset Question and Answer. This can be used at a later time if you forget your password. 8. Enter your e-mail address. You will receive e-mail notification when new information is available in [address] 9. Click Sign Up. You can now view and download portions of your medical record. 10. Click the Download Summary menu link to download a portable copy of your medical information. If you have questions, please visit the Frequently Asked Questions section of the [employer] website. Remember, [employer] is NOT to be used for urgent needs. For medical emergencies, dial 911. Now available from your iPhone and Android! Please provide this summary of care documentation to your next provider. Your primary care clinician is listed as Layla Wiseman. If you have any questions after today's visit, please call [phone].

## 2017-06-25 NOTE — PROGRESS NOTES
6/20/2017    Chief Complaint   Patient presents with    Medication Refill     Xanax and Methadone        HPI: Vince Lees is a 65 y.o. female. Disabled [facility] on the basis of chronic back pain and psych history. She has a significant ETOH abuse history. She drinks 2-3 beers a day. She lives with her son and spouse whom she is . She does not drive due to legal problems related to ETOH DUI. She is dependent on her  for transportation. She is emotionally labile today. She became withdrawn and tearful. Feels she is \"bad or insignificant\" Paranoid. States she has been feeling pretty well. Weaning narcotic pain medicaitions. Current Methadone dose 5mg 1tab am 1/2 tab pm.  Tolerating well. Allergies   Allergen Reactions    Labetalol Diarrhea    Losartan Diarrhea     GI Intolerance         Current Outpatient Prescriptions   Medication Sig Dispense Refill    ALPRAZolam (XANAX) 1 mg tablet TAKE 1 TABLET BY MOUTH THREE TIMES DAILY AS NEEDED ANXIETY. DO NOT TAKE MORE THAN 3MG IN 24 HOURS. 90 Tab 0    methadone (DOLOPHINE) 5 mg tablet ! Tab q am   1/2 tab q pm 45 Tab 0    amLODIPine (NORVASC) 10 mg tablet Take 1 Tab by mouth daily. 90 Tab 3    nebivolol (BYSTOLIC) 10 mg tablet Take 1 Tab by mouth daily. Indications: hypertension 30 Tab 3    promethazine (PHENERGAN) 25 mg tablet TK 1 T PO  Q 8 H PRN  0    amitriptyline (ELAVIL) 50 mg tablet Take 1 Tab by mouth nightly. 30 Tab 5    hydroCHLOROthiazide (HYDRODIURIL) 25 mg tablet Take 1 Tab by mouth daily. 90 Tab 3    topiramate (TOPAMAX) 50 mg tablet Take 1 Tab by mouth two (2) times a day. 60 Tab 5    fluticasone (FLONASE) 50 mcg/actuation nasal spray 2 Sprays by Both Nostrils route daily. 1 Bottle 2    chlorthalidone (HYGROTEN) 25 mg tablet Take 1 Tab by mouth daily. Indications: HYPERTENSION 30 Tab 5    sertraline (ZOLOFT) 100 mg tablet Take 1 Tab by mouth daily.  Indications: ANXIETY WITH DEPRESSION 30 Tab 5    levETIRAcetam 1,000 mg tablet Take 1 Tab by mouth two (2) times a day. Indications: TONIC-CLONIC EPILEPSY TREATMENT ADJUNCT 60 Tab 5    methadone (DOLOPHINE) 5 mg tablet 1 tab po AM; 1/2 tab po PM 50 Tab 0       Past Medical History:   Diagnosis Date    Anxiety     Chronic pain     Depression     Hypertension     Insomnia        Lab Results   Component Value Date/Time    Glucose 106 05/23/2017 08:36 AM    Creatinine 0.56 05/23/2017 08:36 AM       ROS:  Constitutional: No fever, chills or weight loss  Respiratory: No cough, SOB   CV: No chest pain or Palpitations  GI: No nausea, vomiting or diarrhea. : No dysuria or hematuria. Neuro: No headaches, seizures, change in mental status. Physical Exam:   VS Visit Vitals    /78 (BP 1 Location: Right arm, BP Patient Position: Sitting)    Pulse 82    Resp 18    Ht 5' 4\" (1.626 m)    Wt 113 lb 9.6 oz (51.5 kg)    SpO2 98%    BMI 19.5 kg/m2      General Alert,oriented X 3. NAD. Ambulates independently with steady gait. During visit she became withdrawn and tearful, in corner, curled holding her hands clenched against her chest.   She does respond to reassurance. Eyes Conjunctiva and lids normal.    PERRLA, EOMI.   ENMT Mucous membranes moist.    Oropharynx: no erythema, no exudates, no lesions, normal tongue. NECK Thyroid: normal size, nontender. Trachea midline, neck symmetrical and without masses. Carotids 2+ with no bruits. No enlarged nodes. RESP Clear to auscultation and percussion. No rales, wheezes, rhonchi, or rubs. CV RRR, with no S3 or S4, no murmur, no rub. GI   Normal bowel sounds, no bruit, soft, nontender, without masses. MSKEL Normal gait and station. Normal strength and tone, no atrophy. EXT No deformity. Extremities without edema. DP and PT 2+ bilaterally. SKIN Skin warm, normal turgor. NEURO Cranial nerves normal 2-12. No abnormal movement   PSYCH Judgment and insight good.     Fund of knowledge is normal.   Affect is alert and attentive. 1. Moderate episode of recurrent major depressive disorder (HCC)  Emotionally labile. Her mood and behavior fluctuates. She responds to reassurance. - ALPRAZolam (XANAX) 1 mg tablet; TAKE 1 TABLET BY MOUTH THREE TIMES DAILY AS NEEDED ANXIETY. DO NOT TAKE MORE THAN 3MG IN 24 HOURS. Dispense: 90 Tab; Refill: 0    2. Anxiety  Review PDP,   Refill, decreasing dose gradually . - ALPRAZolam (XANAX) 1 mg tablet; TAKE 1 TABLET BY MOUTH THREE TIMES DAILY AS NEEDED ANXIETY. DO NOT TAKE MORE THAN 3MG IN 24 HOURS. Dispense: 90 Tab; Refill: 0    3. Chronic pain syndrome  Review PDP. Ask her to decrease dose to 1/2 tab BID. - methadone (DOLOPHINE) 5 mg tablet; ! Tab q am   1/2 tab q pm  Dispense: 45 Tab; Refill: 0          Orders Placed This Encounter    ALPRAZolam (XANAX) 1 mg tablet     Sig: TAKE 1 TABLET BY MOUTH THREE TIMES DAILY AS NEEDED ANXIETY. DO NOT TAKE MORE THAN 3MG IN 24 HOURS. Dispense:  90 Tab     Refill:  0    methadone (DOLOPHINE) 5 mg tablet     Sig: ! Tab q am   1/2 tab q pm     Dispense:  45 Tab     Refill:  0       Follow-up Disposition:  Return in about 1 month (around 7/20/2017).         JEREMY Granados

## 2017-06-30 NOTE — PROGRESS NOTES
6/30/2017    Chief Complaint   Patient presents with    Medication Refill     Methadone, Xanax and Topamax        HPI: Lowell Winslow is a 65 y.o. female. Complicated history of chronic pain, depression, anxiety, seizure disorder. History of ETOH abuse, question of whether seizure activity was a result of ETOH withdrawal. Occurred in [place]. She minimizes use.  states she has cut back although she is drinking every day. She went to [place] and mood improved. Her mental status fluctuates. Presents for refill of her pain medication. Tapering dose. Tolerating dose reduuction well. Allergies   Allergen Reactions    Labetalol Diarrhea    Losartan Diarrhea     GI Intolerance         Current Outpatient Prescriptions   Medication Sig Dispense Refill    methadone (DOLOPHINE) 5 mg tablet 1 tab po AM; 1/2 tab po PM 50 Tab 0    amitriptyline (ELAVIL) 50 mg tablet Take 1 Tab by mouth nightly. 30 Tab 5    hydroCHLOROthiazide (HYDRODIURIL) 25 mg tablet Take 1 Tab by mouth daily. 90 Tab 3    topiramate (TOPAMAX) 50 mg tablet Take 1 Tab by mouth two (2) times a day. 60 Tab 5    fluticasone (FLONASE) 50 mcg/actuation nasal spray 2 Sprays by Both Nostrils route daily. 1 Bottle 2    chlorthalidone (HYGROTEN) 25 mg tablet Take 1 Tab by mouth daily. Indications: HYPERTENSION 30 Tab 5    sertraline (ZOLOFT) 100 mg tablet Take 1 Tab by mouth daily. Indications: ANXIETY WITH DEPRESSION 30 Tab 5    levETIRAcetam 1,000 mg tablet Take 1 Tab by mouth two (2) times a day. Indications: TONIC-CLONIC EPILEPSY TREATMENT ADJUNCT 60 Tab 5    ALPRAZolam (XANAX) 1 mg tablet TAKE 1 TABLET BY MOUTH THREE TIMES DAILY AS NEEDED ANXIETY. DO NOT TAKE MORE THAN 3MG IN 24 HOURS. 90 Tab 0    methadone (DOLOPHINE) 5 mg tablet ! Tab q am   1/2 tab q pm 45 Tab 0    amLODIPine (NORVASC) 10 mg tablet Take 1 Tab by mouth daily. 90 Tab 3    nebivolol (BYSTOLIC) 10 mg tablet Take 1 Tab by mouth daily.  Indications: hypertension 30 Tab 3  promethazine (PHENERGAN) 25 mg tablet TK 1 T PO  Q 8 H PRN  0       Past Medical History:   Diagnosis Date    Anxiety     Chronic pain     Depression     Hypertension     Insomnia        Lab Results   Component Value Date/Time    Glucose 106 05/23/2017 08:36 AM    Creatinine 0.56 05/23/2017 08:36 AM       ROS:  Constitutional: No fever, chills or weight loss  Respiratory: No cough, SOB   CV: No chest pain or Palpitations  GI: No nausea, vomiting or diarrhea. : No dysuria or hematuria. Neuro: No headaches, seizures, change in mental status. Physical Exam:   VS Visit Vitals    /74 (BP 1 Location: Right arm, BP Patient Position: Sitting)    Resp 19    Ht 5' 4\" (1.626 m)    Wt 106 lb (48.1 kg)    BMI 18.19 kg/m2      General Alert,oriented X 3. NAD. Ambulates independently with steady gait. Eyes Conjunctiva and lids normal.    PERRLA, EOMI.   ENMT Mucous membranes moist.    Oropharynx: no erythema, no exudates, no lesions, normal tongue. NECK Thyroid: normal size, nontender. Trachea midline, neck symmetrical and without masses. Carotids 2+ with no bruits. No enlarged nodes. RESP Clear to auscultation and percussion. No rales, wheezes, rhonchi, or rubs. CV RRR, with no S3 or S4, no murmur, no rub. GI   Normal bowel sounds, no bruit, soft, nontender, without masses. MSKEL Normal gait and station. Normal strength and tone, no atrophy. EXT No deformity. Extremities without edema. DP and PT 2+ bilaterally. SKIN Skin warm, normal turgor. NEURO Cranial nerves normal 2-12. No abnormal movement   PSYCH Judgment and insight good. Fund of knowledge is normal.   Affect is alert and attentive. 1. Moderate episode of recurrent major depressive disorder (HCC)  Continue current regimen     2. Anxiety  Decreasing dose. Review PDP    3. Chronic pain syndrome  Review PDP  Continue to taper dose.    - methadone (DOLOPHINE) 5 mg tablet; 1 tab po AM; 1/2 tab po PM  Dispense: 50 Tab; Refill: 0            Orders Placed This Encounter    DISCONTD: ALPRAZolam (XANAX) 1 mg tablet     Sig: TAKE 1 TABLET BY MOUTH THREE TIMES DAILY AS NEEDED ANXIETY. DO NOT TAKE MORE THAN 3MG IN 24 HOURS. Dispense:  90 Tab     Refill:  0    methadone (DOLOPHINE) 5 mg tablet     Si tab po AM; 1/2 tab po PM     Dispense:  50 Tab     Refill:  0       Follow-up Disposition:  Return in about 1 month (around 2017).         JEREMY Gregg

## 2017-07-24 ENCOUNTER — OFFICE VISIT (OUTPATIENT)
Dept: FAMILY MEDICINE CLINIC | Age: 57
End: 2017-07-24

## 2017-07-24 VITALS
BODY MASS INDEX: 19.16 KG/M2 | SYSTOLIC BLOOD PRESSURE: 130 MMHG | WEIGHT: 111.6 LBS | OXYGEN SATURATION: 100 % | HEART RATE: 60 BPM | DIASTOLIC BLOOD PRESSURE: 82 MMHG | RESPIRATION RATE: 19 BRPM

## 2017-07-24 DIAGNOSIS — F33.1 MODERATE EPISODE OF RECURRENT MAJOR DEPRESSIVE DISORDER (HCC): ICD-10-CM

## 2017-07-24 DIAGNOSIS — F41.9 ANXIETY: ICD-10-CM

## 2017-07-24 DIAGNOSIS — I10 ESSENTIAL HYPERTENSION WITH GOAL BLOOD PRESSURE LESS THAN 130/80: ICD-10-CM

## 2017-07-24 DIAGNOSIS — G89.4 CHRONIC PAIN SYNDROME: ICD-10-CM

## 2017-07-24 RX ORDER — AMITRIPTYLINE HYDROCHLORIDE 25 MG/1
TABLET, FILM COATED ORAL
Qty: 30 TAB | Refills: 11 | Status: SHIPPED | OUTPATIENT
Start: 2017-07-24 | End: 2017-10-12 | Stop reason: SDUPTHER

## 2017-07-24 RX ORDER — AMITRIPTYLINE HYDROCHLORIDE 25 MG/1
TABLET, FILM COATED ORAL
Refills: 5 | COMMUNITY
Start: 2017-06-28 | End: 2017-07-24 | Stop reason: SDUPTHER

## 2017-07-24 RX ORDER — CHLORTHALIDONE 25 MG/1
25 TABLET ORAL DAILY
Qty: 30 TAB | Refills: 11 | Status: SHIPPED | OUTPATIENT
Start: 2017-07-24 | End: 2018-01-16

## 2017-07-24 RX ORDER — METHADONE HYDROCHLORIDE 5 MG/1
TABLET ORAL
Qty: 45 TAB | Refills: 0 | Status: SHIPPED | OUTPATIENT
Start: 2017-07-24 | End: 2017-08-15 | Stop reason: SDUPTHER

## 2017-07-24 RX ORDER — ALPRAZOLAM 1 MG/1
TABLET ORAL
Qty: 90 TAB | Refills: 2 | Status: SHIPPED | OUTPATIENT
Start: 2017-07-24 | End: 2017-10-12 | Stop reason: SDUPTHER

## 2017-07-24 NOTE — MR AVS SNAPSHOT
Visit Information Date & Time Provider Department Dept. Phone Encounter #  
 7/24/2017  9:30 AM Cynthia Stenier NP [facility] [id number] Upcoming Health Maintenance Date Due Hepatitis C Screening 1960 Pneumococcal 19-64 Medium Risk (1 of 1 - PPSV23) 4/8/1980 DTaP/Tdap/Td series (1 - Tdap) 4/8/1982 PAP AKA CERVICAL CYTOLOGY 4/8/1982 BREAST CANCER SCRN MAMMOGRAM 4/8/2011 FOBT Q 1 YEAR AGE 50-75 4/8/2011 INFLUENZA AGE 9 TO ADULT 8/1/2017 Allergies as of 7/24/2017  Review Complete On: 7/24/2017 By: Cynthia Steiner NP Severity Noted Reaction Type Reactions Labetalol Medium 06/07/2016    Diarrhea Losartan Medium 02/11/2016    Diarrhea GI Intolerance Current Immunizations  Reviewed on 7/24/2017 No immunizations on file. Reviewed by Joe Burdick on 7/24/2017 at  9:06 AM  
 Reviewed by Joe Burdick on 7/24/2017 at  9:06 AM  
You Were Diagnosed With   
  
 Codes Comments Essential hypertension with goal blood pressure less than 130/80     ICD-10-CM: I10 
ICD-9-CM: 401.9 Moderate episode of recurrent major depressive disorder (HCC)     ICD-10-CM: F33.1 ICD-9-CM: 296.32 Anxiety     ICD-10-CM: F41.9 ICD-9-CM: 300.00 Chronic pain syndrome     ICD-10-CM: G89.4 ICD-9-CM: 338. 4 Vitals BP Pulse Resp Weight(growth percentile) SpO2 BMI  
 130/82 (BP 1 Location: Right arm, BP Patient Position: Sitting) 60 19 111 lb 9.6 oz (50.6 kg) 100% 19.16 kg/m2 OB Status Smoking Status Menopause Current Every Day Smoker Vitals History BMI and BSA Data Body Mass Index Body Surface Area  
 19.16 kg/m 2 1.51 m 2 Preferred Pharmacy Pharmacy Name Phone [address], [address] [facility] [address] [facility]  [address] [phone] Your Updated Medication List  
  
   
 This list is accurate as of: 7/24/17 10:13 AM.  Always use your most recent med list.  
  
  
  
  
 ALPRAZolam 1 mg tablet Commonly known as:  XANAX  
TAKE 1 TABLET BY MOUTH THREE TIMES DAILY AS NEEDED ANXIETY. DO NOT TAKE MORE THAN 3MG IN 24 HOURS. amitriptyline 25 mg tablet Commonly known as:  ELAVIL Take  1 T PO Q Night  Indications: NEUROPATHIC PAIN  
  
 amLODIPine 10 mg tablet Commonly known as:  Jean Flori Take 1 Tab by mouth daily. chlorthalidone 25 mg tablet Commonly known as:  Ulyess Cirri Take 1 Tab by mouth daily. Indications: hypertension  
  
 fluticasone 50 mcg/actuation nasal spray Commonly known as:  Ruthe Para 2 Sprays by Both Nostrils route daily. hydroCHLOROthiazide 25 mg tablet Commonly known as:  HYDRODIURIL Take 1 Tab by mouth daily. levETIRAcetam 1,000 mg tablet Take 1 Tab by mouth two (2) times a day. Indications: TONIC-CLONIC EPILEPSY TREATMENT ADJUNCT  
  
 * methadone 5 mg tablet Commonly known as:  DOLOPHINE  
1 tab po AM; 1/2 tab po PM  
  
 * methadone 5 mg tablet Commonly known as:  DOLOPHINE  
! Tab q am   1/2 tab q pm  
  
 nebivolol 10 mg tablet Commonly known as:  BYSTOLIC Take 1 Tab by mouth daily. Indications: hypertension  
  
 promethazine 25 mg tablet Commonly known as:  PHENERGAN  
TK 1 T PO  Q 8 H PRN  
  
 topiramate 50 mg tablet Commonly known as:  TOPAMAX Take 1 Tab by mouth two (2) times a day. * Notice: This list has 2 medication(s) that are the same as other medications prescribed for you. Read the directions carefully, and ask your doctor or other care provider to review them with you. Prescriptions Printed Refills ALPRAZolam (XANAX) 1 mg tablet 2 Sig: TAKE 1 TABLET BY MOUTH THREE TIMES DAILY AS NEEDED ANXIETY. DO NOT TAKE MORE THAN 3MG IN 24 HOURS. Class: Print  
 methadone (DOLOPHINE) 5 mg tablet 0 Sig: ! Tab q am   1/2 tab q pm  
 Class: Print Prescriptions Sent to Pharmacy Refills  
 amitriptyline (ELAVIL) 25 mg tablet 11 Sig: Take  1 T PO Q Night  Indications: NEUROPATHIC PAIN Class: Normal  
 Pharmacy: [facility] [address]  Ph #: [phone]  
 chlorthalidone (HYGROTEN) 25 mg tablet 11 Sig: Take 1 Tab by mouth daily. Indications: hypertension Class: Normal  
 Pharmacy: [facility] [address]  Ph #: [phone] Route: Oral  
  
Introducing [facility] SERVICES! [employer] introduces [employer] patient portal. Now you can access parts of your medical record, email your doctor's office, and request medication refills online. 1. In your internet browser, go to [email or web address] 2. Click on the First Time User? Click Here link in the Sign In box. You will see the New Member Sign Up page. 3. Enter your [employer] Access Code exactly as it appears below. You will not need to use this code after youve completed the sign-up process. If you do not sign up before the expiration date, you must request a new code. · [employer] Access Code: [email or web address] Expires: 8/21/2017  8:33 AM 
 
4. Enter the last four digits of your Social Security Number (xxxx) and Date of Birth (mm/dd/yyyy) as indicated and click Submit. You will be taken to the next sign-up page. 5. Create a [email or web address] ID. This will be your [employer] login ID and cannot be changed, so think of one that is secure and easy to remember. 6. Create a [employer] password. You can change your password at any time. 7. Enter your Password Reset Question and Answer. This can be used at a later time if you forget your password. 8. Enter your e-mail address. You will receive e-mail notification when new information is available in [address] 9. Click Sign Up. You can now view and download portions of your medical record. 10. Click the Download Summary menu link to download a portable copy of your medical information. If you have questions, please visit the Frequently Asked Questions section of the [employer] website. Remember, [employer] is NOT to be used for urgent needs. For medical emergencies, dial 911. Now available from your iPhone and Android! Please provide this summary of care documentation to your next provider. Your primary care clinician is listed as Claire Graff. If you have any questions after today's visit, please call [phone].

## 2017-07-28 NOTE — PROGRESS NOTES
7/28/2017    Chief Complaint   Patient presents with    Medication Refill     Methadone, Xanax, Chlorthalidone       HPI: Jessica Lema is a 65 y.o. female.  WF who lives with her ex- and son. Complicated history of chronic pain, depression, anxiety, seizure disorder. History of ETOH abuse, question of whether seizure activity was a result of ETOH withdrawal. Occurred in [place]. She minimizes use.  states she has cut back although she is drinking every day 2-3 beers a day. She went to [facility] and mood improved. Her mental status fluctuates. She has been doing better recently. She has a woman who spends time with her during the day when she is not working. HPTN frequently labile. Stable today. Current meds: CCB,BB and diuretic. Tolerating well, no adverse effects. Presents for refill of her pain medication. Tapering dose. Tolerating dose reduuction well. Allergies   Allergen Reactions    Labetalol Diarrhea    Losartan Diarrhea     GI Intolerance         Current Outpatient Prescriptions   Medication Sig Dispense Refill    amitriptyline (ELAVIL) 25 mg tablet Take  1 T PO Q Night  Indications: NEUROPATHIC PAIN 30 Tab 11    chlorthalidone (HYGROTEN) 25 mg tablet Take 1 Tab by mouth daily. Indications: hypertension 30 Tab 11    ALPRAZolam (XANAX) 1 mg tablet TAKE 1 TABLET BY MOUTH THREE TIMES DAILY AS NEEDED ANXIETY. DO NOT TAKE MORE THAN 3MG IN 24 HOURS. 90 Tab 2    methadone (DOLOPHINE) 5 mg tablet ! Tab q am   1/2 tab q pm 45 Tab 0    nebivolol (BYSTOLIC) 10 mg tablet Take 1 Tab by mouth daily. Indications: hypertension 30 Tab 3    promethazine (PHENERGAN) 25 mg tablet TK 1 T PO  Q 8 H PRN  0    methadone (DOLOPHINE) 5 mg tablet 1 tab po AM; 1/2 tab po PM 50 Tab 0    topiramate (TOPAMAX) 50 mg tablet Take 1 Tab by mouth two (2) times a day. 60 Tab 5    fluticasone (FLONASE) 50 mcg/actuation nasal spray 2 Sprays by Both Nostrils route daily.  1 Bottle 2    levETIRAcetam 1,000 mg tablet Take 1 Tab by mouth two (2) times a day. Indications: TONIC-CLONIC EPILEPSY TREATMENT ADJUNCT 60 Tab 5    amLODIPine (NORVASC) 10 mg tablet Take 1 Tab by mouth daily. 90 Tab 3    hydroCHLOROthiazide (HYDRODIURIL) 25 mg tablet Take 1 Tab by mouth daily. 90 Tab 3       Past Medical History:   Diagnosis Date    Anxiety     Chronic pain     Depression     Hypertension     Insomnia        Lab Results   Component Value Date/Time    Glucose 106 05/23/2017 08:36 AM    Creatinine 0.56 05/23/2017 08:36 AM       ROS:  Constitutional: No fever, chills or weight loss  Respiratory: No cough, SOB   CV: No chest pain or Palpitations  GI: No nausea, vomiting or diarrhea. : No dysuria or hematuria. Neuro: No headaches, seizures, change in mental status. Physical Exam:   VS Visit Vitals    /82 (BP 1 Location: Right arm, BP Patient Position: Sitting)    Pulse 60    Resp 19    Wt 111 lb 9.6 oz (50.6 kg)    SpO2 100%    BMI 19.16 kg/m2      General Alert,oriented X 3. NAD. Ambulates independently with steady gait. Mood is pleasant and animated. Eyes Conjunctiva and lids normal.    PERRLA, EOMI.   ENMT Mucous membranes moist.    Edentulous  Oropharynx: no erythema, no exudates, no lesions, normal tongue. NECK Thyroid: normal size, nontender. Trachea midline, neck symmetrical and without masses. Carotids 2+ with no bruits. No enlarged nodes. RESP Clear to auscultation and percussion. No rales, wheezes, rhonchi, or rubs. CV RRR, with no S3 or S4, no murmur, no rub. GI   Normal bowel sounds, no bruit, soft, nontender, without masses. MSKEL Normal gait and station. Normal strength and tone, no atrophy. EXT No deformity. Extremities without edema. DP and PT 2+ bilaterally. SKIN Skin warm, normal turgor. NEURO Cranial nerves normal 2-12. No abnormal movement   PSYCH Judgment and insight good.     Fund of knowledge is normal.   Affect is alert and attentive. 1. Essential hypertension with goal blood pressure less than 130/80  Stable. Refill   - chlorthalidone (HYGROTEN) 25 mg tablet; Take 1 Tab by mouth daily. Indications: hypertension  Dispense: 30 Tab; Refill: 11    2. Moderate episode of recurrent major depressive disorder (HCC)  Stable refill  - amitriptyline (ELAVIL) 25 mg tablet; Take  1 T PO Q Night  Indications: NEUROPATHIC PAIN  Dispense: 30 Tab; Refill: 11  - ALPRAZolam (XANAX) 1 mg tablet; TAKE 1 TABLET BY MOUTH THREE TIMES DAILY AS NEEDED ANXIETY. DO NOT TAKE MORE THAN 3MG IN 24 HOURS. Dispense: 90 Tab; Refill: 2    3. Anxiety  As above   - amitriptyline (ELAVIL) 25 mg tablet; Take  1 T PO Q Night  Indications: NEUROPATHIC PAIN  Dispense: 30 Tab; Refill: 11  - ALPRAZolam (XANAX) 1 mg tablet; TAKE 1 TABLET BY MOUTH THREE TIMES DAILY AS NEEDED ANXIETY. DO NOT TAKE MORE THAN 3MG IN 24 HOURS. Dispense: 90 Tab; Refill: 2    4. Chronic pain syndrome  As above. Reviewed PDP. Tapering dose. - amitriptyline (ELAVIL) 25 mg tablet; Take  1 T PO Q Night  Indications: NEUROPATHIC PAIN  Dispense: 30 Tab; Refill: 11  - methadone (DOLOPHINE) 5 mg tablet; ! Tab q am   1/2 tab q pm  Dispense: 45 Tab; Refill: 0        Orders Placed This Encounter    DISCONTD: amitriptyline (ELAVIL) 25 mg tablet     Sig: TK 1 T PO Q NIGHT FOR NEUROPATHIC PAIN     Refill:  5    amitriptyline (ELAVIL) 25 mg tablet     Sig: Take  1 T PO Q Night  Indications: NEUROPATHIC PAIN     Dispense:  30 Tab     Refill:  11    chlorthalidone (HYGROTEN) 25 mg tablet     Sig: Take 1 Tab by mouth daily. Indications: hypertension     Dispense:  30 Tab     Refill:  11    ALPRAZolam (XANAX) 1 mg tablet     Sig: TAKE 1 TABLET BY MOUTH THREE TIMES DAILY AS NEEDED ANXIETY. DO NOT TAKE MORE THAN 3MG IN 24 HOURS.      Dispense:  90 Tab     Refill:  2    methadone (DOLOPHINE) 5 mg tablet     Sig: ! Tab q am   1/2 tab q pm     Dispense:  45 Tab     Refill:  0       Follow-up Disposition:  Return in about 1 month (around 8/24/2017).         JEREMY Up

## 2017-08-15 ENCOUNTER — OFFICE VISIT (OUTPATIENT)
Dept: FAMILY MEDICINE CLINIC | Age: 57
End: 2017-08-15

## 2017-08-15 VITALS
RESPIRATION RATE: 18 BRPM | DIASTOLIC BLOOD PRESSURE: 80 MMHG | HEART RATE: 78 BPM | SYSTOLIC BLOOD PRESSURE: 130 MMHG | OXYGEN SATURATION: 96 % | BODY MASS INDEX: 20.01 KG/M2 | WEIGHT: 116.6 LBS

## 2017-08-15 DIAGNOSIS — G89.4 CHRONIC PAIN SYNDROME: ICD-10-CM

## 2017-08-15 DIAGNOSIS — I10 ESSENTIAL HYPERTENSION: Primary | ICD-10-CM

## 2017-08-15 RX ORDER — METHADONE HYDROCHLORIDE 5 MG/1
2.5 TABLET ORAL
Qty: 30 TAB | Refills: 0 | Status: SHIPPED | OUTPATIENT
Start: 2017-08-15 | End: 2017-10-12 | Stop reason: ALTCHOICE

## 2017-08-15 RX ORDER — METHADONE HYDROCHLORIDE 5 MG/1
2.5 TABLET ORAL
Qty: 15 TAB | Refills: 0 | Status: SHIPPED | OUTPATIENT
Start: 2017-08-15 | End: 2017-10-12 | Stop reason: ALTCHOICE

## 2017-08-15 NOTE — MR AVS SNAPSHOT
Visit Information Date & Time Provider Department Dept. Phone Encounter #  
 8/15/2017 10:00 AM Zoie Ruiz NP [facility] [id number] Upcoming Health Maintenance Date Due Hepatitis C Screening 1960 Pneumococcal 19-64 Medium Risk (1 of 1 - PPSV23) 4/8/1980 DTaP/Tdap/Td series (1 - Tdap) 4/8/1982 PAP AKA CERVICAL CYTOLOGY 4/8/1982 BREAST CANCER SCRN MAMMOGRAM 4/8/2011 FOBT Q 1 YEAR AGE 50-75 4/8/2011 INFLUENZA AGE 9 TO ADULT 8/1/2017 Allergies as of 8/15/2017  Review Complete On: 8/15/2017 By: Zoie Ruiz NP Severity Noted Reaction Type Reactions Labetalol Medium 06/07/2016    Diarrhea Losartan Medium 02/11/2016    Diarrhea GI Intolerance Current Immunizations  Reviewed on 8/15/2017 No immunizations on file. Reviewed by Joseluis Mortensen on 8/15/2017 at  9:56 AM  
 Reviewed by Joseluis Mortensen on 8/15/2017 at  9:57 AM  
You Were Diagnosed With   
  
 Codes Comments Essential hypertension    -  Primary ICD-10-CM: I10 
ICD-9-CM: 401.9 Chronic pain syndrome     ICD-10-CM: G89.4 ICD-9-CM: 338. 4 Vitals BP Pulse Resp Weight(growth percentile) SpO2 BMI  
 130/80 (BP 1 Location: Left arm, BP Patient Position: Sitting) 78 18 116 lb 9.6 oz (52.9 kg) 96% 20.01 kg/m2 OB Status Smoking Status Menopause Current Every Day Smoker Vitals History BMI and BSA Data Body Mass Index Body Surface Area 20.01 kg/m 2 1.55 m 2 Preferred Pharmacy Pharmacy Name Phone [phone] [facility] AT [facility] OF  [address] & [address] [facility] [place] [facility] [phone] Your Updated Medication List  
  
   
This list is accurate as of: 8/15/17 10:57 AM.  Always use your most recent med list.  
  
  
  
  
 ALPRAZolam 1 mg tablet Commonly known as:  XANAX  
TAKE 1 TABLET BY MOUTH THREE TIMES DAILY AS NEEDED ANXIETY.  DO NOT TAKE MORE THAN 3MG IN 24 HOURS. amitriptyline 25 mg tablet Commonly known as:  ELAVIL Take  1 T PO Q Night  Indications: NEUROPATHIC PAIN  
  
 amLODIPine 10 mg tablet Commonly known as:  Lewsandrine Frazier Take 1 Tab by mouth daily. chlorthalidone 25 mg tablet Commonly known as:  Leonor Noe Take 1 Tab by mouth daily. Indications: hypertension  
  
 fluticasone 50 mcg/actuation nasal spray Commonly known as:  Derick Decatur 2 Sprays by Both Nostrils route daily. hydroCHLOROthiazide 25 mg tablet Commonly known as:  HYDRODIURIL Take 1 Tab by mouth daily. levETIRAcetam 1,000 mg tablet Take 1 Tab by mouth two (2) times a day. Indications: TONIC-CLONIC EPILEPSY TREATMENT ADJUNCT  
  
 * methadone 5 mg tablet Commonly known as:  DOLOPHINE  
1 tab po AM; 1/2 tab po PM  
  
 * methadone 5 mg tablet Commonly known as:  DOLOPHINE Take 0.5 Tabs by mouth two (2) times daily as needed for Pain. Max Daily Amount: 5 mg. ! Tab q am   1/2 tab q pm  
  
 * methadone 5 mg tablet Commonly known as:  DOLOPHINE Take 0.5 Tabs by mouth two (2) times daily as needed for Pain. Max Daily Amount: 5 mg.  
  
 nebivolol 10 mg tablet Commonly known as:  BYSTOLIC Take 1 Tab by mouth daily. Indications: hypertension  
  
 promethazine 25 mg tablet Commonly known as:  PHENERGAN  
TK 1 T PO  Q 8 H PRN  
  
 topiramate 50 mg tablet Commonly known as:  TOPAMAX Take 1 Tab by mouth two (2) times a day. * Notice: This list has 3 medication(s) that are the same as other medications prescribed for you. Read the directions carefully, and ask your doctor or other care provider to review them with you. Prescriptions Printed Refills  
 methadone (DOLOPHINE) 5 mg tablet 0 Sig: Take 0.5 Tabs by mouth two (2) times daily as needed for Pain. Max Daily Amount: 5 mg. ! Tab q am   1/2 tab q pm  
 Class: Print  Route: Oral  
 methadone (DOLOPHINE) 5 mg tablet 0  
 Sig: Take 0.5 Tabs by mouth two (2) times daily as needed for Pain. Max Daily Amount: 5 mg. Class: Print Route: Oral  
  
Introducing [facility]! Nikunj Bailey introduces [employer] patient portal. Now you can access parts of your medical record, email your doctor's office, and request medication refills online. 1. In your internet browser, go to [email or web address] 2. Click on the First Time User? Click Here link in the Sign In box. You will see the New Member Sign Up page. 3. Enter your SelectHub Access Code exactly as it appears below. You will not need to use this code after youve completed the sign-up process. If you do not sign up before the expiration date, you must request a new code. · [employer] Access Code: [email or web address] Expires: 8/21/2017  8:33 AM 
 
4. Enter the last four digits of your Social Security Number (xxxx) and Date of Birth (mm/dd/yyyy) as indicated and click Submit. You will be taken to the next sign-up page. 5. Create a SelectHub ID. This will be your SelectHub login ID and cannot be changed, so think of one that is secure and easy to remember. 6. Create a SelectHub password. You can change your password at any time. 7. Enter your Password Reset Question and Answer. This can be used at a later time if you forget your password. 8. Enter your e-mail address. You will receive e-mail notification when new information is available in [address] 9. Click Sign Up. You can now view and download portions of your medical record. 10. Click the Download Summary menu link to download a portable copy of your medical information. If you have questions, please visit the Frequently Asked Questions section of the SelectHub website. Remember, SelectHub is NOT to be used for urgent needs. For medical emergencies, dial 911. Now available from your iPhone and Android! Please provide this summary of care documentation to your next provider. Your primary care clinician is listed as Susan Casarez. If you have any questions after today's visit, please call [phone].

## 2017-08-15 NOTE — PROGRESS NOTES
9/11/2017    Chief Complaint   Patient presents with    Medication Refill     Methadone       HPI: Anil Shannon is a 65 y.o. female.  WF who lives with her ex- and son. Complicated history of chronic pain, depression, anxiety, seizure disorder. History of ETOH abuse, question of whether seizure activity was a result of ETOH withdrawal. Occurred in [place]. She minimizes use. She now has a woman who spends time with her and helps her. Has improved her mood and emotional lability. She went to [place] and mood improved. Her mental status fluctuates. She has been doing better recently. She has a woman who spends time with her during the day when she is not working. HPTN frequently labile. Stable today. Current meds: CCB,BB and diuretic. Tolerating well, no adverse effects. Presents for refill of her pain medication. Tapering dose. Tolerating dose reduuction well. Allergies   Allergen Reactions    Labetalol Diarrhea    Losartan Diarrhea     GI Intolerance         Current Outpatient Prescriptions   Medication Sig Dispense Refill    methadone (DOLOPHINE) 5 mg tablet Take 0.5 Tabs by mouth two (2) times daily as needed for Pain. Max Daily Amount: 5 mg. ! Tab q am   1/2 tab q pm 30 Tab 0    methadone (DOLOPHINE) 5 mg tablet Take 0.5 Tabs by mouth two (2) times daily as needed for Pain. Max Daily Amount: 5 mg. 15 Tab 0    amitriptyline (ELAVIL) 25 mg tablet Take  1 T PO Q Night  Indications: NEUROPATHIC PAIN 30 Tab 11    chlorthalidone (HYGROTEN) 25 mg tablet Take 1 Tab by mouth daily. Indications: hypertension 30 Tab 11    ALPRAZolam (XANAX) 1 mg tablet TAKE 1 TABLET BY MOUTH THREE TIMES DAILY AS NEEDED ANXIETY. DO NOT TAKE MORE THAN 3MG IN 24 HOURS. 90 Tab 2    amLODIPine (NORVASC) 10 mg tablet Take 1 Tab by mouth daily. 90 Tab 3    nebivolol (BYSTOLIC) 10 mg tablet Take 1 Tab by mouth daily.  Indications: hypertension 30 Tab 3    promethazine (PHENERGAN) 25 mg tablet TK 1 T PO  Q 8 H PRN  0    methadone (DOLOPHINE) 5 mg tablet 1 tab po AM; 1/2 tab po PM 50 Tab 0    hydroCHLOROthiazide (HYDRODIURIL) 25 mg tablet Take 1 Tab by mouth daily. 90 Tab 3    topiramate (TOPAMAX) 50 mg tablet Take 1 Tab by mouth two (2) times a day. 60 Tab 5    fluticasone (FLONASE) 50 mcg/actuation nasal spray 2 Sprays by Both Nostrils route daily. 1 Bottle 2    levETIRAcetam 1,000 mg tablet Take 1 Tab by mouth two (2) times a day. Indications: TONIC-CLONIC EPILEPSY TREATMENT ADJUNCT 60 Tab 5       Past Medical History:   Diagnosis Date    Anxiety     Chronic pain     Depression     Hypertension     Insomnia        Lab Results   Component Value Date/Time    Glucose 106 05/23/2017 08:36 AM    Creatinine 0.56 05/23/2017 08:36 AM       ROS:  Constitutional: No fever, chills or weight loss  Respiratory: No cough, SOB   CV: No chest pain or Palpitations  GI: No nausea, vomiting or diarrhea. : No dysuria or hematuria. Neuro: No headaches, seizures, change in mental status. Physical Exam:   VS Visit Vitals    /80 (BP 1 Location: Left arm, BP Patient Position: Sitting)    Pulse 78    Resp 18    Wt 116 lb 9.6 oz (52.9 kg)    SpO2 96%    BMI 20.01 kg/m2      General Alert,oriented X 3. NAD. Ambulates independently with steady gait. Eyes Conjunctiva and lids normal.    PERRLA, EOMI.   ENMT Mucous membranes moist.    Oropharynx: no erythema, no exudates, no lesions, normal tongue. NECK Thyroid: normal size, nontender. Trachea midline, neck symmetrical and without masses. Carotids 2+ with no bruits. No enlarged nodes. RESP Clear to auscultation and percussion. No rales, wheezes, rhonchi, or rubs. CV RRR, with no S3 or S4, no murmur, no rub. GI   Normal bowel sounds, no bruit, soft, nontender, without masses. MSKEL Normal gait and station. Normal strength and tone, no atrophy. EXT No deformity. Extremities without edema. DP and PT 2+ bilaterally.     SKIN Skin warm, normal turgor. NEURO Cranial nerves normal 2-12. No abnormal movement   PSYCH Judgment and insight good. Fund of knowledge is normal.   Affect is alert and attentive. 1. Chronic pain syndrome  Reviewed PDP. Also taking Amitriptyline for depression and chronic pain. Topiramate for pain modulation and headache prophylaxis. She has tolerated dose reduction well. Refill and continue taper as tolerated. - methadone (DOLOPHINE) 5 mg tablet; Take 0.5 Tabs by mouth two (2) times daily as needed for Pain. Max Daily Amount: 5 mg. ! Tab q am   1/2 tab q pm  Dispense: 30 Tab; Refill: 0  - methadone (DOLOPHINE) 5 mg tablet; Take 0.5 Tabs by mouth two (2) times daily as needed for Pain. Max Daily Amount: 5 mg. Dispense: 15 Tab; Refill: 0    2. Essential hypertension  BP is well controlled today. Can be labile. 3. Seizure disorder  No recent seizure activity. Topiramate and Levetiracetam.   Continue current regimen. Follow-up Disposition:  Return in about 2 months (around 10/15/2017).         JEREMY Post

## 2017-10-12 ENCOUNTER — OFFICE VISIT (OUTPATIENT)
Dept: FAMILY MEDICINE CLINIC | Age: 57
End: 2017-10-12

## 2017-10-12 VITALS
WEIGHT: 110.8 LBS | HEART RATE: 90 BPM | OXYGEN SATURATION: 98 % | RESPIRATION RATE: 20 BRPM | DIASTOLIC BLOOD PRESSURE: 90 MMHG | SYSTOLIC BLOOD PRESSURE: 205 MMHG | BODY MASS INDEX: 19.02 KG/M2

## 2017-10-12 DIAGNOSIS — I10 ESSENTIAL HYPERTENSION: ICD-10-CM

## 2017-10-12 DIAGNOSIS — F32.0 MILD SINGLE CURRENT EPISODE OF MAJOR DEPRESSIVE DISORDER (HCC): ICD-10-CM

## 2017-10-12 DIAGNOSIS — G89.4 CHRONIC PAIN SYNDROME: ICD-10-CM

## 2017-10-12 DIAGNOSIS — F41.9 ANXIETY: ICD-10-CM

## 2017-10-12 DIAGNOSIS — B18.2 CHRONIC HEPATITIS C WITHOUT HEPATIC COMA (HCC): ICD-10-CM

## 2017-10-12 DIAGNOSIS — M47.812 DJD (DEGENERATIVE JOINT DISEASE), CERVICAL: ICD-10-CM

## 2017-10-12 DIAGNOSIS — G40.909 SEIZURE DISORDER (HCC): ICD-10-CM

## 2017-10-12 DIAGNOSIS — R07.9 CHEST PAIN, UNSPECIFIED TYPE: Primary | ICD-10-CM

## 2017-10-12 DIAGNOSIS — Z51.81 THERAPEUTIC DRUG MONITORING: ICD-10-CM

## 2017-10-12 DIAGNOSIS — F33.1 MODERATE EPISODE OF RECURRENT MAJOR DEPRESSIVE DISORDER (HCC): ICD-10-CM

## 2017-10-12 PROBLEM — I25.10 ASCVD (ARTERIOSCLEROTIC CARDIOVASCULAR DISEASE): Status: ACTIVE | Noted: 2017-10-12

## 2017-10-12 RX ORDER — AMITRIPTYLINE HYDROCHLORIDE 25 MG/1
TABLET, FILM COATED ORAL
Qty: 30 TAB | Refills: 11
Start: 2017-10-12 | End: 2017-12-08 | Stop reason: SDUPTHER

## 2017-10-12 RX ORDER — IBUPROFEN 600 MG/1
600 TABLET ORAL
Qty: 90 TAB | Refills: 11 | Status: SHIPPED | OUTPATIENT
Start: 2017-10-12 | End: 2018-02-02 | Stop reason: ALTCHOICE

## 2017-10-12 RX ORDER — TOPIRAMATE 100 MG/1
100 TABLET, FILM COATED ORAL 2 TIMES DAILY
Qty: 60 TAB | Refills: 11 | Status: SHIPPED | OUTPATIENT
Start: 2017-10-12 | End: 2018-01-01 | Stop reason: SDUPTHER

## 2017-10-12 RX ORDER — ALPRAZOLAM 1 MG/1
TABLET ORAL
Qty: 60 TAB | Refills: 2 | Status: SHIPPED | OUTPATIENT
Start: 2017-10-12 | End: 2017-12-08 | Stop reason: SDUPTHER

## 2017-10-12 NOTE — PROGRESS NOTES
10/12/2017    Chief Complaint   Patient presents with    Medication Refill       HPI: Miguel Spence is a 65 y.o. female.  WF who lives with her ex- and son. Complicated history of chronic pain, depression, anxiety. History of ETOH abuse, with possible ETOH withdrawal seizures. She is drinking again, about 6 daily. She no longer has an assistant. Mood and emotional lability are still bothersome. Chronic pain. Diagnosis DJD C-spine. Currently down to 2.5mg methadone BID, nadine that cut well. Neuromodulator: keppra and topamax  Antidepressant TCA Elavil, nadine well. Denies Constipation, Sedation. No concerns for overuse or diversion seen, but has been using MJ off and on lately. Med use agreement reviewed, on chart. Hypertension. Blood pressures have been up a lot. Management at last visit included con't current tx, but missed her pills this AM. Also not taking bystolic due to cost. Current regimen: beta-blocker, calcium channel blocker and thiazide diuretic. Symptoms include headache and occ chest pain. Had spell of \"elephant on chest\" for about a day last week. Went to bed, lay down, felt better by the next day. Was having a lot of chest congestion with that. Patient denies chest pain, palpitations, peripheral edema. Lab review:   Lab Results   Component Value Date/Time    Sodium 135 05/23/2017 08:36 AM    Potassium 4.6 05/23/2017 08:36 AM    Chloride 88 05/23/2017 08:36 AM    CO2 27 05/23/2017 08:36 AM    Glucose 106 05/23/2017 08:36 AM    BUN 7 05/23/2017 08:36 AM    Creatinine 0.56 05/23/2017 08:36 AM    BUN/Creatinine ratio 13 05/23/2017 08:36 AM    GFR est  05/23/2017 08:36 AM    GFR est non- 05/23/2017 08:36 AM    Calcium 10.0 05/23/2017 08:36 AM   No results found for: CHOL, CHOLPOCT, CHOLX, CHLST, CHOLV, HDL, HDLPOC, LDL, LDLCPOC, LDLC, DLDLP, VLDLC, VLDL, TGLX, TRIGL, TRIGP, TGLPOCT, CHHD, CHHDX    Seizure d/o. On topamax and keppra. Taking regularly.  No sz since last visit. PMH, SH, Medications/Allergies: reviewed, on chart. ROS:  Constitutional: No fever, chills or weight loss  Respiratory: No cough, SOB   CV: No chest pain or Palpitations    Visit Vitals    BP (!) 205/90 (BP 1 Location: Right arm, BP Patient Position: Sitting)    Pulse 90    Resp 20    Wt 110 lb 12.8 oz (50.3 kg)    SpO2 98%    BMI 19.02 kg/m2     Wt Readings from Last 3 Encounters:   10/12/17 110 lb 12.8 oz (50.3 kg)   08/15/17 116 lb 9.6 oz (52.9 kg)   07/24/17 111 lb 9.6 oz (50.6 kg)     Physical Examination: General appearance - alert, thin, frenetic appearing patient with rapid fire and disjointed speech. Mental status - alert, oriented to person, place, and time  Eyes - pupils equal and reactive, extraocular eye movements intact  ENT - bilateral external ears and nose normal. Normal lips  Neck - supple, no significant adenopathy, no thyromegaly or mass  Lymphatics - no palpable lymphadenopathy, no hepatosplenomegaly  Chest - clear to auscultation, no wheezes, rales or rhonchi, symmetric air entry  Heart - normal rate, regular rhythm, normal S1, S2, no murmurs, rubs, clicks or gallops  Extremities - peripheral pulses normal, no pedal edema, no clubbing or cyanosis    EKG: sinus tach. Ns ST/Tw changes, same as 2015 check. A/P  DJD with Chronic pain  Reviewed , in goal. Ready to come off methadone. D/C. She has tolerated dose reduction well. Try using motrin 600mg TID prn for pain relief instead. Boost Amitriptyline to 50mg every day. Should help with depression and chronic pain. Con't Topiramate for pain modulation, seizure prophjy, and headache prophylaxis. Try boost to 100mg BID. Hep C  Undetermined activity. Check Quant and genotype. Check LFTs. If active (probably), plan referral for mgmt with GI. Essential hypertension  BP is very high today, but didn't take any of her pills today. Gave a SLNG 0.6RQ and a bystolic 96QR pill from samples with some improvement. EKG is reassuring and pt having no chest pain now, so will con't to work on [employer]. Seizure disorder  No recent seizure activity. Doing well on Topiramate and Levetiracetam. Adjustment as above. Depression  Not well controlled. Try boost elavil to 50mg daily. Topamax should help. Alcoholism  Plan to work on cutting back at f/u  F/U 1mo.

## 2017-10-12 NOTE — MR AVS SNAPSHOT
Visit Information Date & Time Provider Department Dept. Phone Encounter #  
 10/12/2017  8:50 AM Inocencia Brown MD [address] [place] [address] [id number] Upcoming Health Maintenance Date Due Hepatitis C Screening 1960 DTaP/Tdap/Td series (1 - Tdap) 4/8/1982 PAP AKA CERVICAL CYTOLOGY 4/8/1982 BREAST CANCER SCRN MAMMOGRAM 4/8/2011 FOBT Q 1 YEAR AGE 50-75 4/8/2011 Allergies as of 10/12/2017  Review Complete On: 10/12/2017 By: Inocencia Brown MD  
  
 Severity Noted Reaction Type Reactions Labetalol Medium 06/07/2016    Diarrhea Losartan Medium 02/11/2016    Diarrhea GI Intolerance Current Immunizations  Reviewed on 8/15/2017 No immunizations on file. Not reviewed this visit You Were Diagnosed With   
  
 Codes Comments Chest pain, unspecified type    -  Primary ICD-10-CM: R07.9 ICD-9-CM: 786.50 Anxiety     ICD-10-CM: F41.9 ICD-9-CM: 300.00 Chronic pain syndrome     ICD-10-CM: G89.4 ICD-9-CM: 338.4 Essential hypertension     ICD-10-CM: I10 
ICD-9-CM: 401.9 Mild single current episode of major depressive disorder (HCC)     ICD-10-CM: F32.0 ICD-9-CM: 296.21 Chronic hepatitis C without hepatic coma (HCC)     ICD-10-CM: B18.2 ICD-9-CM: 070.54 Moderate episode of recurrent major depressive disorder (HCC)     ICD-10-CM: F33.1 ICD-9-CM: 296.32 Seizure disorder ([address]     ICD-10-CM: G40.909 ICD-9-CM: 345.90 DJD (degenerative joint disease), cervical     ICD-10-CM: M50.30 ICD-9-CM: 722.4 Therapeutic drug monitoring     ICD-10-CM: Z51.81 
ICD-9-CM: V58.83 Vitals BP Pulse Resp Weight(growth percentile) SpO2 BMI  
 (!) 205/90 (BP 1 Location: Right arm, BP Patient Position: Sitting) 90 20 110 lb 12.8 oz (50.3 kg) 98% 19.02 kg/m2 OB Status Smoking Status Menopause Current Every Day Smoker Vitals History BMI and BSA Data Body Mass Index Body Surface Area 19.02 kg/m 2 1.51 m 2 Preferred Pharmacy Pharmacy Name Phone [address] [facility] AT [facility] OF  [address] & [address] [facility]. Radha Aponte [phone] Your Updated Medication List  
  
   
This list is accurate as of: 10/12/17 10:47 AM.  Always use your most recent med list.  
  
  
  
  
 ALPRAZolam 1 mg tablet Commonly known as:  XANAX  
TAKE 1 TABLET BY MOUTH two TIMES DAILY AS NEEDED ANXIETY  
  
 amitriptyline 25 mg tablet Commonly known as:  ELAVIL Take  2 Tabs PO nightly for nerves  Indications: NEUROPATHIC PAIN  
  
 amLODIPine 10 mg tablet Commonly known as:  Joellen Pace Take 1 Tab by mouth daily. chlorthalidone 25 mg tablet Commonly known as:  Debby Juniper Take 1 Tab by mouth daily. Indications: hypertension  
  
 fluticasone 50 mcg/actuation nasal spray Commonly known as:  Frederic Metro 2 Sprays by Both Nostrils route daily. hydroCHLOROthiazide 25 mg tablet Commonly known as:  HYDRODIURIL Take 1 Tab by mouth daily. ibuprofen 600 mg tablet Commonly known as:  MOTRIN Take 1 Tab by mouth three (3) times daily as needed for Pain. Indications: replaces methadone  
  
 levETIRAcetam 1,000 mg tablet Take 1 Tab by mouth two (2) times a day. Indications: TONIC-CLONIC EPILEPSY TREATMENT ADJUNCT  
  
 nebivolol 10 mg tablet Commonly known as:  BYSTOLIC Take 1 Tab by mouth daily. Indications: hypertension  
  
 promethazine 25 mg tablet Commonly known as:  PHENERGAN  
TK 1 T PO  Q 8 H PRN  
  
 topiramate 100 mg tablet Commonly known as:  TOPAMAX Take 1 Tab by mouth two (2) times a day. Indications: nerves and seizures Prescriptions Printed Refills ALPRAZolam (XANAX) 1 mg tablet 2 Sig: TAKE 1 TABLET BY MOUTH two TIMES DAILY AS NEEDED ANXIETY Class: Print Prescriptions Sent to Pharmacy  Refills  
 ibuprofen (MOTRIN) 600 mg tablet 11  
 Sig: Take 1 Tab by mouth three (3) times daily as needed for Pain. Indications: replaces methadone Class: Normal  
 Pharmacy: [facility] [address]  Ph #: [phone] Route: Oral  
 topiramate (TOPAMAX) 100 mg tablet 11 Sig: Take 1 Tab by mouth two (2) times a day. Indications: nerves and seizures Class: Normal  
 Pharmacy: [facility] [address]  Ph #: [phone] Route: Oral  
  
We Performed the Following AMB POC EKG ROUTINE W/ 12 LEADS, INTER & REP [21865 CPT(R)] [address] MONITORING [[id number] Custom] HEPATITIS C QT BY PCR WITH REFLEX GENOTYPE [[id number] Custom] LIPID PANEL [97176 CPT(R)] METABOLIC PANEL, COMPREHENSIVE [93177 CPT(R)] Patient Instructions If you have any questions regarding Phrazit, you may call Phrazit support at [phone]. Introducing [facility]! Linden Jules introduces "[email or web address]" patient portal. Now you can access parts of your medical record, email your doctor's office, and request medication refills online. 1. In your internet browser, go to [email or web address] 2. Click on the First Time User? Click Here link in the Sign In box. You will see the New Member Sign Up page. 3. Enter your "[email or web address]" Access Code exactly as it appears below. You will not need to use this code after youve completed the sign-up process. If you do not sign up before the expiration date, you must request a new code. · "[email or web address]" Access Code: [email or web address] Expires: 1/10/2018  8:39 AM 
 
4. Enter the last four digits of your Social Security Number (xxxx) and Date of Birth (mm/dd/yyyy) as indicated and click Submit. You will be taken to the next sign-up page. 5. Create a "[email or web address]" ID.  This will be your [employer] login ID and cannot be changed, so think of one that is secure and easy to remember. 6. Create a Yu[employer] password. You can change your password at any time. 7. Enter your Password Reset Question and Answer. This can be used at a later time if you forget your password. 8. Enter your e-mail address. You will receive e-mail notification when new information is available in [address] 9. Click Sign Up. You can now view and download portions of your medical record. 10. Click the Download Summary menu link to download a portable copy of your medical information. If you have questions, please visit the Frequently Asked Questions section of the [employer] website. Remember, [employer] is NOT to be used for urgent needs. For medical emergencies, dial 911. Now available from your iPhone and Android! Please provide this summary of care documentation to your next provider. Your primary care clinician is listed as Carlin Sotelo. If you have any questions after today's visit, please call [phone].

## 2017-10-19 ENCOUNTER — LAB ONLY (OUTPATIENT)
Dept: FAMILY MEDICINE CLINIC | Age: 57
End: 2017-10-19

## 2017-10-19 NOTE — PROGRESS NOTES
Pt was seen by Dr. Salvador Villanueva on 10/12/17 and labs were ordered however we were unsuccessful at obtaining a specimen. Pt returned to clinic today for another attempt for blood draw. Specimen obtained and will send for testing.  TRICIA

## 2017-10-19 NOTE — MR AVS SNAPSHOT
Visit Information Date & Time Provider Department Dept. Phone Encounter #  
 10/19/2017 12:05 PM Murali Cheek [id number] Your Appointments 12/8/2017  2:20 PM  
ESTABLISHED PATIENT with Grover Licea MD  
[facility] [address] ([address]) Appt Note: 2 mo f/u  
 [address] [facility] [address] [address] [phone]  
  
   
 [address] [facility] [address] [address] Upcoming Health Maintenance Date Due DTaP/Tdap/Td series (1 - Tdap) 4/8/1982 PAP AKA CERVICAL CYTOLOGY 4/8/1982 BREAST CANCER SCRN MAMMOGRAM 4/8/2011 FOBT Q 1 YEAR AGE 50-75 4/8/2011 Allergies as of 10/19/2017  Review Complete On: 10/12/2017 By: Grover Licea MD  
  
 Severity Noted Reaction Type Reactions Labetalol Medium 06/07/2016    Diarrhea Losartan Medium 02/11/2016    Diarrhea GI Intolerance Current Immunizations  Reviewed on 8/15/2017 No immunizations on file. Not reviewed this visit Vitals OB Status Smoking Status Menopause Current Every Day Smoker Preferred Pharmacy Pharmacy Name Phone [address], [address] AT [facility] OF [address] [facility]. [facility] [phone] Your Updated Medication List  
  
   
This list is accurate as of: 10/19/17 12:40 PM.  Always use your most recent med list.  
  
  
  
  
 ALPRAZolam 1 mg tablet Commonly known as:  XANAX  
TAKE 1 TABLET BY MOUTH two TIMES DAILY AS NEEDED ANXIETY  
  
 amitriptyline 25 mg tablet Commonly known as:  ELAVIL Take  2 Tabs PO nightly for nerves  Indications: NEUROPATHIC PAIN  
  
 amLODIPine 10 mg tablet Commonly known as:  Lennis Eagles Take 1 Tab by mouth daily. chlorthalidone 25 mg tablet Commonly known as:  Lakesha Cheek Take 1 Tab by mouth daily. Indications: hypertension  
  
 fluticasone 50 mcg/actuation nasal spray Commonly known as:  Abdoul Riser 2 Sprays by Both Nostrils route daily. hydroCHLOROthiazide 25 mg tablet Commonly known as:  HYDRODIURIL Take 1 Tab by mouth daily. ibuprofen 600 mg tablet Commonly known as:  MOTRIN Take 1 Tab by mouth three (3) times daily as needed for Pain. Indications: replaces methadone  
  
 levETIRAcetam 1,000 mg tablet Take 1 Tab by mouth two (2) times a day. Indications: TONIC-CLONIC EPILEPSY TREATMENT ADJUNCT  
  
 nebivolol 10 mg tablet Commonly known as:  BYSTOLIC Take 1 Tab by mouth daily. Indications: hypertension  
  
 promethazine 25 mg tablet Commonly known as:  PHENERGAN  
TK 1 T PO  Q 8 H PRN  
  
 topiramate 100 mg tablet Commonly known as:  TOPAMAX Take 1 Tab by mouth two (2) times a day. Indications: nerves and seizures Introducing [facility] & [facility]! Renee Salmon introduces [employer] patient portal. Now you can access parts of your medical record, email your doctor's office, and request medication refills online. 1. In your internet browser, go to [email or web address] 2. Click on the First Time User? Click Here link in the Sign In box. You will see the New Member Sign Up page. 3. Enter your [employer] Access Code exactly as it appears below. You will not need to use this code after youve completed the sign-up process. If you do not sign up before the expiration date, you must request a new code. · [employer] Access Code: [email or web address] Expires: 1/10/2018  8:39 AM 
 
4. Enter the last four digits of your Social Security Number (xxxx) and Date of Birth (mm/dd/yyyy) as indicated and click Submit. You will be taken to the next sign-up page. 5. Create a [employer] ID. This will be your [employer] login ID and cannot be changed, so think of one that is secure and easy to remember. 6. Create a [employer] password. You can change your password at any time. 7. Enter your Password Reset Question and Answer.  This can be used at a later time if you forget your password. 8. Enter your e-mail address. You will receive e-mail notification when new information is available in [address] 9. Click Sign Up. You can now view and download portions of your medical record. 10. Click the Download Summary menu link to download a portable copy of your medical information. If you have questions, please visit the Frequently Asked Questions section of the [employer] website. Remember, [employer] is NOT to be used for urgent needs. For medical emergencies, dial 911. Now available from your iPhone and Android! Please provide this summary of care documentation to your next provider. Your primary care clinician is listed as Redd Ca. If you have any questions after today's visit, please call [phone].

## 2017-10-20 LAB — DRUGS UR: NORMAL

## 2017-10-22 LAB
ALBUMIN SERPL-MCNC: 4.6 G/DL (ref 3.5–5.5)
ALBUMIN/GLOB SERPL: 1.6 {RATIO} (ref 1.2–2.2)
ALP SERPL-CCNC: 100 IU/L (ref 39–117)
ALT SERPL-CCNC: 152 IU/L (ref 0–32)
AST SERPL-CCNC: 111 IU/L (ref 0–40)
BILIRUB SERPL-MCNC: <0.2 MG/DL (ref 0–1.2)
BUN SERPL-MCNC: 20 MG/DL (ref 6–24)
BUN/CREAT SERPL: 16 (ref 9–23)
CALCIUM SERPL-MCNC: 10.3 MG/DL (ref 8.7–10.2)
CHLORIDE SERPL-SCNC: 94 MMOL/L (ref 96–106)
CHOLEST SERPL-MCNC: 213 MG/DL (ref 100–199)
CO2 SERPL-SCNC: 30 MMOL/L (ref 18–29)
CREAT SERPL-MCNC: 1.25 MG/DL (ref 0.57–1)
GFR SERPLBLD CREATININE-BSD FMLA CKD-EPI: 48 ML/MIN/1.73
GFR SERPLBLD CREATININE-BSD FMLA CKD-EPI: 56 ML/MIN/1.73
GLOBULIN SER CALC-MCNC: 2.9 G/DL (ref 1.5–4.5)
GLUCOSE SERPL-MCNC: 80 MG/DL (ref 65–99)
HCV GENOTYPE: NORMAL
HCV GENTYP SERPL NAA+PROBE: NORMAL
HCV RNA SERPL NAA+PROBE-ACNC: NORMAL IU/ML
HCV RNA SERPL NAA+PROBE-ACNC: NORMAL IU/ML
HCV RNA SERPL NAA+PROBE-LOG IU: 7.18 LOG10 IU/ML
HDLC SERPL-MCNC: 80 MG/DL
LDLC SERPL CALC-MCNC: 100 MG/DL (ref 0–99)
PLEASE NOTE, 550474: NORMAL
POTASSIUM SERPL-SCNC: 4.4 MMOL/L (ref 3.5–5.2)
PROT SERPL-MCNC: 7.5 G/DL (ref 6–8.5)
SODIUM SERPL-SCNC: 137 MMOL/L (ref 134–144)
TEST INFORMATION: NORMAL
TRIGL SERPL-MCNC: 163 MG/DL (ref 0–149)
VLDLC SERPL CALC-MCNC: 33 MG/DL (ref 5–40)

## 2017-10-23 NOTE — PROGRESS NOTES
Hep C is chronic and active, 2b genotype. We'll discuss referral to GI for mgmt at next visit. Also, pt is using MJ, which we'll discuss at f/u.

## 2017-11-16 ENCOUNTER — TELEPHONE (OUTPATIENT)
Dept: FAMILY MEDICINE CLINIC | Age: 57
End: 2017-11-16

## 2017-11-17 ENCOUNTER — TELEPHONE (OUTPATIENT)
Dept: FAMILY MEDICINE CLINIC | Age: 57
End: 2017-11-17

## 2017-12-08 ENCOUNTER — OFFICE VISIT (OUTPATIENT)
Dept: FAMILY MEDICINE CLINIC | Age: 57
End: 2017-12-08

## 2017-12-08 VITALS
DIASTOLIC BLOOD PRESSURE: 80 MMHG | TEMPERATURE: 98.2 F | SYSTOLIC BLOOD PRESSURE: 160 MMHG | HEIGHT: 64 IN | RESPIRATION RATE: 20 BRPM | WEIGHT: 123.4 LBS | BODY MASS INDEX: 21.07 KG/M2 | HEART RATE: 90 BPM | OXYGEN SATURATION: 99 %

## 2017-12-08 DIAGNOSIS — M47.812 DJD (DEGENERATIVE JOINT DISEASE), CERVICAL: ICD-10-CM

## 2017-12-08 DIAGNOSIS — F33.1 MODERATE EPISODE OF RECURRENT MAJOR DEPRESSIVE DISORDER (HCC): ICD-10-CM

## 2017-12-08 DIAGNOSIS — G89.4 CHRONIC PAIN SYNDROME: ICD-10-CM

## 2017-12-08 DIAGNOSIS — B18.2 HEP C W/O COMA, CHRONIC (HCC): Primary | ICD-10-CM

## 2017-12-08 DIAGNOSIS — I10 ESSENTIAL HYPERTENSION: ICD-10-CM

## 2017-12-08 DIAGNOSIS — E05.90 HYPERTHYROIDISM: ICD-10-CM

## 2017-12-08 DIAGNOSIS — F41.9 ANXIETY: ICD-10-CM

## 2017-12-08 RX ORDER — AMITRIPTYLINE HYDROCHLORIDE 50 MG/1
50 TABLET, FILM COATED ORAL
Qty: 90 TAB | Refills: 3 | Status: SHIPPED | OUTPATIENT
Start: 2017-12-08 | End: 2018-01-01 | Stop reason: SDUPTHER

## 2017-12-08 RX ORDER — ALPRAZOLAM 1 MG/1
TABLET ORAL
Qty: 60 TAB | Refills: 0 | Status: SHIPPED | OUTPATIENT
Start: 2018-01-07 | End: 2018-02-02

## 2017-12-08 RX ORDER — PROMETHAZINE HYDROCHLORIDE 25 MG/1
TABLET ORAL
Qty: 30 TAB | Refills: 2 | Status: SHIPPED | OUTPATIENT
Start: 2017-12-08 | End: 2018-02-02

## 2017-12-08 RX ORDER — NEBIVOLOL 20 MG/1
20 TABLET ORAL DAILY
Qty: 90 TAB | Refills: 3 | Status: SHIPPED | OUTPATIENT
Start: 2017-12-08 | End: 2018-01-01 | Stop reason: SDUPTHER

## 2017-12-08 NOTE — MR AVS SNAPSHOT
Visit Information Date & Time Provider Department Dept. Phone Encounter #  
 12/8/2017  2:20 PM Harinder Mahan MD [address] [place] [address] [id number] Follow-up Instructions Return in about 2 months (around 2/8/2018). Follow-up and Disposition History Upcoming Health Maintenance Date Due DTaP/Tdap/Td series (1 - Tdap) 4/8/1982 PAP AKA CERVICAL CYTOLOGY 4/8/1982 FOBT Q 1 YEAR AGE 50-75 4/8/2011 BREAST CANCER SCRN MAMMOGRAM 12/8/2019 Allergies as of 12/8/2017  Review Complete On: 12/8/2017 By: Harinder Mahan MD  
  
 Severity Noted Reaction Type Reactions Labetalol Medium 06/07/2016    Diarrhea Losartan Medium 02/11/2016    Diarrhea GI Intolerance Current Immunizations  Reviewed on 8/15/2017 No immunizations on file. Not reviewed this visit You Were Diagnosed With   
  
 Codes Comments Hep C w/o coma, chronic (HCC)    -  Primary ICD-10-CM: B18.2 ICD-9-CM: 070.54 Moderate episode of recurrent major depressive disorder (HCC)     ICD-10-CM: F33.1 ICD-9-CM: 296.32 Anxiety     ICD-10-CM: F41.9 ICD-9-CM: 300.00 Chronic pain syndrome     ICD-10-CM: G89.4 ICD-9-CM: 338.4 Essential hypertension     ICD-10-CM: I10 
ICD-9-CM: 401.9 DJD (degenerative joint disease), cervical     ICD-10-CM: M50.30 ICD-9-CM: 722.4 Hyperthyroidism     ICD-10-CM: E05.90 ICD-9-CM: 242.90 Vitals BP Pulse Temp Resp Height(growth percentile) Weight(growth percentile) 160/80 (BP 1 Location: Left arm, BP Patient Position: Sitting) 90 98.2 °F (36.8 °C) (Oral) 20 5' 4\" (1.626 m) 123 lb 6.4 oz (56 kg) SpO2 BMI OB Status Smoking Status 99% 21.18 kg/m2 Menopause Current Every Day Smoker Vitals History BMI and BSA Data Body Mass Index Body Surface Area  
 21.18 kg/m 2 1.59 m 2 Preferred Pharmacy Pharmacy Name Phone [address], [address] [facility] AT [facility] [address] [place] [phone] Your Updated Medication List  
  
   
This list is accurate as of: 12/8/17  2:55 PM.  Always use your most recent med list.  
  
  
  
  
 ALPRAZolam 1 mg tablet Commonly known as:  XANAX  
TAKE 1 TABLET BY MOUTH two TIMES DAILY AS NEEDED ANXIETY Start taking on:  1/7/2018  
  
 amitriptyline 50 mg tablet Commonly known as:  ELAVIL Take 1 Tab by mouth nightly. Indications: NEUROPATHIC PAIN  
  
 amLODIPine 10 mg tablet Commonly known as:  Holly Nita Take 1 Tab by mouth daily. chlorthalidone 25 mg tablet Commonly known as:  Wilhemena Nickels Take 1 Tab by mouth daily. Indications: hypertension  
  
 fluticasone 50 mcg/actuation nasal spray Commonly known as:  Kiera Jumper 2 Sprays by Both Nostrils route daily. ibuprofen 600 mg tablet Commonly known as:  MOTRIN Take 1 Tab by mouth three (3) times daily as needed for Pain. Indications: replaces methadone  
  
 levETIRAcetam 1,000 mg tablet Take 1 Tab by mouth two (2) times a day. Indications: TONIC-CLONIC EPILEPSY TREATMENT ADJUNCT  
  
 nebivolol 20 mg tablet Commonly known as:  BYSTOLIC Take 1 Tab by mouth daily. Indications: hypertension  
  
 promethazine 25 mg tablet Commonly known as:  PHENERGAN  
1 po TID for nausea, migraine  
  
 topiramate 100 mg tablet Commonly known as:  TOPAMAX Take 1 Tab by mouth two (2) times a day. Indications: nerves and seizures Prescriptions Printed Refills ALPRAZolam (XANAX) 1 mg tablet 0 Starting on: 1/7/2018 Sig: TAKE 1 TABLET BY MOUTH two TIMES DAILY AS NEEDED ANXIETY Class: Print Prescriptions Sent to Pharmacy Refills  
 amitriptyline (ELAVIL) 50 mg tablet 3 Sig: Take 1 Tab by mouth nightly. Indications: NEUROPATHIC PAIN  Class: Normal  
 Pharmacy: [facility], [address] [facility] of [address]  [facility] Ph #: [phone] Route: Oral  
 nebivolol (BYSTOLIC) 20 mg tablet 3 Sig: Take 1 Tab by mouth daily. Indications: hypertension Class: Normal  
 Pharmacy: [facility] [address]  Ph #: [phone] Route: Oral  
 promethazine (PHENERGAN) 25 mg tablet 2 Si po TID for nausea, migraine Class: Normal  
 Pharmacy: [facility] [address]  Ph #: [phone] We Performed the Following REFERRAL TO GASTROENTEROLOGY [QUZ10 Custom] Comments:  
 Or first available GI. Chronic active hep C, genotype 2b. Follow-up Instructions Return in about 2 months (around 2018). Referral Information Referral ID Referred By Referred To  
  
 [id number] [facility], [address] [facility] [address] [place], 200 [facility] Visits Status Start Date End Date 1 New Request 17 If your referral has a status of pending review or denied, additional information will be sent to support the outcome of this decision. Introducing [facility] & HEALTH SERVICES! [employer] introduces [employer] patient portal. Now you can access parts of your medical record, email your doctor's office, and request medication refills online. 1. In your internet browser, go to [email or web address] "[email or web address]/ZipList 2. Click on the First Time User? Click Here link in the Sign In box. You will see the New Member Sign Up page. 3. Enter your [employer] Access Code exactly as it appears below. You will not need to use this code after youve completed the sign-up process. If you do not sign up before the expiration date, you must request a new code. · [employer] Access Code: [email or web address] Expires: 1/10/2018  7:39 AM 
 
 4. Enter the last four digits of your Social Security Number (xxxx) and Date of Birth (mm/dd/yyyy) as indicated and click Submit. You will be taken to the next sign-up page. 5. Create a [employer] ID. This will be your [employer] login ID and cannot be changed, so think of one that is secure and easy to remember. 6. Create a [employer] password. You can change your password at any time. 7. Enter your Password Reset Question and Answer. This can be used at a later time if you forget your password. 8. Enter your e-mail address. You will receive e-mail notification when new information is available in [address] 9. Click Sign Up. You can now view and download portions of your medical record. 10. Click the Download Summary menu link to download a portable copy of your medical information. If you have questions, please visit the Frequently Asked Questions section of the [employer] website. Remember, [employer] is NOT to be used for urgent needs. For medical emergencies, dial 911. Now available from your iPhone and Android! Please provide this summary of care documentation to your next provider. Your primary care clinician is listed as River Ying. If you have any questions after today's visit, please call [phone].

## 2017-12-08 NOTE — PROGRESS NOTES
12/8/2017    Chief Complaint   Patient presents with    Results    Medication Refill       HPI: Joann Oneill is a 65 y.o. female.  WF who lives with her ex- and son. Complicated history of chronic pain, depression, anxiety. History of ETOH abuse, with possible ETOH withdrawal seizures. She is drinking much less now. Mood and emotional lability are better. Chronic pain. Diagnosis DJD C-spine. Currently off methadone. Using motrin 600mg TID prn pain. Neuromodulator: keppra and topamax  Antidepressant TCA Elavil, nadine well. Denies Constipation, Sedation. No concerns for overuse or diversion seen, but has been using MJ off and on lately. Med use agreement reviewed, on chart. Hypertension. Blood pressures have been improving and pulse better with r/s bystolic 02PL every day, but ran out again a week ago. Current regimen: beta-blocker, calcium channel blocker and thiazide diuretic. Symptoms include mild headache off and on. Patient denies chest pain, palpitations, peripheral edema. Lab review:   Lab Results   Component Value Date/Time    Sodium 137 10/12/2017 10:27 AM    Potassium 4.4 10/12/2017 10:27 AM    Chloride 94 10/12/2017 10:27 AM    CO2 30 10/12/2017 10:27 AM    Glucose 80 10/12/2017 10:27 AM    BUN 20 10/12/2017 10:27 AM    Creatinine 1.25 10/12/2017 10:27 AM    BUN/Creatinine ratio 16 10/12/2017 10:27 AM    GFR est AA 56 10/12/2017 10:27 AM    GFR est non-AA 48 10/12/2017 10:27 AM    Calcium 10.3 10/12/2017 10:27 AM     Lab Results   Component Value Date/Time    Cholesterol, total 213 10/12/2017 10:27 AM    HDL Cholesterol 80 10/12/2017 10:27 AM    LDL, calculated 100 10/12/2017 10:27 AM    VLDL, calculated 33 10/12/2017 10:27 AM    Triglyceride 163 10/12/2017 10:27 AM     Seizure d/o. On topamax and keppra. Taking regularly. No sz since last visit. Last MJ use several mo ago. Hep C, chronic active.   Due for referral.  Results for orders placed or performed in visit on 10/12/17   HEPATITIS C QT BY PCR WITH REFLEX GENOTYPE   Result Value Ref Range    Hepatitis C Quantitation See Final Results IU/mL    HCV RNA-IU/mL [id number] IU/mL    HCV log10 7.176 log10 IU/mL    TEST INFORMATION Comment     HCV Genotype Comment    METABOLIC PANEL, COMPREHENSIVE    Alk. phosphatase 100 39 - 117 IU/L    AST (SGOT) 111 (H) 0 - 40 IU/L    ALT (SGPT) 152 (H) 0 - 32 IU/L   HEPATITIS C GENOTYPE   Result Value Ref Range    Hepatitis C Genotype 2b      Hyperthyroid  Remains on b-blocker. Last several TSH's suppressed. Lab Results   Component Value Date/Time    TSH 0.288 05/23/2017 08:36 AM     PMH, SH, Medications/Allergies: reviewed, on chart. Drinking only a 6 pack a week now. Still smoking, but a little less. ROS:  Constitutional: No fever, chills or weight loss  Respiratory: No cough, SOB   CV: No chest pain or Palpitations    Visit Vitals    /80 (BP 1 Location: Left arm, BP Patient Position: Sitting)    Pulse 90    Temp 98.2 °F (36.8 °C) (Oral)    Resp 20    Ht 5' 4\" (1.626 m)    Wt 123 lb 6.4 oz (56 kg)    SpO2 99%    BMI 21.18 kg/m2     Wt Readings from Last 3 Encounters:   12/08/17 123 lb 6.4 oz (56 kg)   10/12/17 110 lb 12.8 oz (50.3 kg)   08/15/17 116 lb 9.6 oz (52.9 kg)     Physical Examination: General appearance - alert, thin, frenetic appearing patient with rapid fire and disjointed speech.    Mental status - alert, oriented to person, place, and time  Eyes - pupils equal and reactive, extraocular eye movements intact  ENT - bilateral external ears and nose normal. Normal lips  Neck - supple, no significant adenopathy, no thyromegaly or mass  Lymphatics - no palpable lymphadenopathy, no hepatosplenomegaly  Chest - clear to auscultation, no wheezes, rales or rhonchi, symmetric air entry  Heart - normal rate, regular rhythm, normal S1, S2, no murmurs, rubs, clicks or gallops  Extremities - peripheral pulses normal, no pedal edema, no clubbing or cyanosis    A/P  DJD with Chronic pain  Reviewed , in goal. Ready to come off methadone. D/C. She has tolerated dose reduction well. Try using motrin 600mg TID prn for pain relief instead. Con't Amitriptyline 50mg every day, helping with depression and chronic pain. Con't Topiramate 100mg BID for pain modulation, seizure prophy, and headache prophylaxis. Hep C  Active. Set up referral to GI in [place]. Hyperthyroid  Con't b-blocker for now, consider check TSI, TPO ab with next labs, and getting a new thyroid US next spring. Essential hypertension  BP is better, but ran out of bystolic again. Samples given and coupon given. Seizure disorder  No recent seizure activity. Doing well on Topiramate and Levetiracetam. con't. Depression  Better control. Con't elavil 50mg daily and Topamax. Alcoholism  Con't to work on cutting back. F/U 2mo.

## 2018-01-01 ENCOUNTER — OFFICE VISIT (OUTPATIENT)
Dept: FAMILY MEDICINE CLINIC | Age: 58
End: 2018-01-01

## 2018-01-01 ENCOUNTER — TELEPHONE (OUTPATIENT)
Dept: FAMILY MEDICINE CLINIC | Age: 58
End: 2018-01-01

## 2018-01-01 VITALS
HEART RATE: 78 BPM | OXYGEN SATURATION: 98 % | DIASTOLIC BLOOD PRESSURE: 78 MMHG | WEIGHT: 118.2 LBS | HEIGHT: 65 IN | BODY MASS INDEX: 19.69 KG/M2 | RESPIRATION RATE: 10 BRPM | TEMPERATURE: 97.5 F | SYSTOLIC BLOOD PRESSURE: 120 MMHG

## 2018-01-01 VITALS
TEMPERATURE: 98.6 F | RESPIRATION RATE: 12 BRPM | HEART RATE: 67 BPM | BODY MASS INDEX: 20.08 KG/M2 | WEIGHT: 117.6 LBS | DIASTOLIC BLOOD PRESSURE: 72 MMHG | OXYGEN SATURATION: 98 % | HEIGHT: 64 IN | SYSTOLIC BLOOD PRESSURE: 156 MMHG

## 2018-01-01 DIAGNOSIS — F32.0 CURRENT MILD EPISODE OF MAJOR DEPRESSIVE DISORDER WITHOUT PRIOR EPISODE (HCC): ICD-10-CM

## 2018-01-01 DIAGNOSIS — G89.4 CHRONIC PAIN SYNDROME: ICD-10-CM

## 2018-01-01 DIAGNOSIS — I10 ESSENTIAL HYPERTENSION: Primary | ICD-10-CM

## 2018-01-01 DIAGNOSIS — B18.2 CHRONIC HEPATITIS C WITHOUT HEPATIC COMA (HCC): ICD-10-CM

## 2018-01-01 DIAGNOSIS — F10.11 ALCOHOL USE DISORDER, MILD, IN EARLY REMISSION, ABUSE: ICD-10-CM

## 2018-01-01 DIAGNOSIS — E83.42 HYPOMAGNESEMIA: ICD-10-CM

## 2018-01-01 DIAGNOSIS — I10 ESSENTIAL HYPERTENSION WITH GOAL BLOOD PRESSURE LESS THAN 130/80: ICD-10-CM

## 2018-01-01 DIAGNOSIS — F33.1 MODERATE EPISODE OF RECURRENT MAJOR DEPRESSIVE DISORDER (HCC): ICD-10-CM

## 2018-01-01 DIAGNOSIS — Z12.11 SCREEN FOR COLON CANCER: ICD-10-CM

## 2018-01-01 DIAGNOSIS — E05.90 HYPERTHYROIDISM: ICD-10-CM

## 2018-01-01 DIAGNOSIS — F41.9 ANXIETY: ICD-10-CM

## 2018-01-01 DIAGNOSIS — G40.909 SEIZURE DISORDER (HCC): ICD-10-CM

## 2018-01-01 DIAGNOSIS — M15.9 OSTEOARTHRITIS OF MULTIPLE JOINTS, UNSPECIFIED OSTEOARTHRITIS TYPE: ICD-10-CM

## 2018-01-01 DIAGNOSIS — Z01.419 WELL WOMAN EXAM WITH ROUTINE GYNECOLOGICAL EXAM: Primary | ICD-10-CM

## 2018-01-01 DIAGNOSIS — I10 ESSENTIAL HYPERTENSION: ICD-10-CM

## 2018-01-01 DIAGNOSIS — E87.6 HYPOKALEMIA: ICD-10-CM

## 2018-01-01 DIAGNOSIS — Z12.4 SCREENING FOR CERVICAL CANCER: ICD-10-CM

## 2018-01-01 DIAGNOSIS — E83.39 HYPOPHOSPHATASIA: ICD-10-CM

## 2018-01-01 LAB
ALBUMIN SERPL-MCNC: 4.7 G/DL (ref 3.5–5.5)
ALBUMIN/GLOB SERPL: 1.7 {RATIO} (ref 1.2–2.2)
ALP SERPL-CCNC: 100 IU/L (ref 39–117)
ALT SERPL-CCNC: 43 IU/L (ref 0–32)
AST SERPL-CCNC: 58 IU/L (ref 0–40)
BILIRUB SERPL-MCNC: 0.3 MG/DL (ref 0–1.2)
BUN SERPL-MCNC: 8 MG/DL (ref 6–24)
BUN/CREAT SERPL: 13 (ref 9–23)
C TRACH RRNA CVX QL NAA+PROBE: NEGATIVE
CALCIUM SERPL-MCNC: 9.5 MG/DL (ref 8.7–10.2)
CHLORIDE SERPL-SCNC: 90 MMOL/L (ref 96–106)
CO2 SERPL-SCNC: 26 MMOL/L (ref 18–29)
CREAT SERPL-MCNC: 0.6 MG/DL (ref 0.57–1)
CYTOLOGIST CVX/VAG CYTO: NORMAL
CYTOLOGY CVX/VAG DOC THIN PREP: NORMAL
DX ICD CODE: NORMAL
GFR SERPLBLD CREATININE-BSD FMLA CKD-EPI: 102 ML/MIN/1.73
GFR SERPLBLD CREATININE-BSD FMLA CKD-EPI: 117 ML/MIN/1.73
GLOBULIN SER CALC-MCNC: 2.7 G/DL (ref 1.5–4.5)
GLUCOSE SERPL-MCNC: 77 MG/DL (ref 65–99)
HEMOCCULT STL QL IA: NEGATIVE
LABCORP, 190119: NORMAL
Lab: NORMAL
MAGNESIUM SERPL-MCNC: 1.9 MG/DL (ref 1.6–2.3)
N GONORRHOEA RRNA CVX QL NAA+PROBE: NEGATIVE
OTHER STN SPEC: NORMAL
PATH REPORT.FINAL DX SPEC: NORMAL
PHOSPHATE SERPL-MCNC: 3.5 MG/DL (ref 2.5–4.5)
POTASSIUM SERPL-SCNC: 3.9 MMOL/L (ref 3.5–5.2)
PROT SERPL-MCNC: 7.4 G/DL (ref 6–8.5)
SODIUM SERPL-SCNC: 132 MMOL/L (ref 134–144)
STAT OF ADQ CVX/VAG CYTO-IMP: NORMAL
T VAGINALIS RRNA SPEC QL NAA+PROBE: NEGATIVE
THYROGLOB AB SERPL-ACNC: <1 IU/ML (ref 0–0.9)
THYROPEROXIDASE AB SERPL-ACNC: 18 IU/ML (ref 0–34)
TSH SERPL DL<=0.005 MIU/L-ACNC: 1.01 UIU/ML (ref 0.45–4.5)
TSI ACT/NOR SER: <0.1 IU/L (ref 0–0.55)

## 2018-01-01 RX ORDER — MELOXICAM 15 MG/1
TABLET ORAL
Qty: 30 TAB | Refills: 0 | OUTPATIENT
Start: 2018-01-01

## 2018-01-01 RX ORDER — AMLODIPINE BESYLATE 10 MG/1
10 TABLET ORAL DAILY
Qty: 90 TAB | Refills: 3 | Status: SHIPPED | OUTPATIENT
Start: 2018-01-01

## 2018-01-01 RX ORDER — MELOXICAM 15 MG/1
TABLET ORAL
Qty: 30 TAB | Refills: 3 | Status: SHIPPED | OUTPATIENT
Start: 2018-01-01 | End: 2018-01-01 | Stop reason: SDUPTHER

## 2018-01-01 RX ORDER — AMITRIPTYLINE HYDROCHLORIDE 50 MG/1
TABLET, FILM COATED ORAL
Qty: 90 TAB | Refills: 0 | Status: SHIPPED | OUTPATIENT
Start: 2018-01-01 | End: 2019-01-01 | Stop reason: SDUPTHER

## 2018-01-14 ENCOUNTER — APPOINTMENT (OUTPATIENT)
Dept: GENERAL RADIOLOGY | Age: 58
DRG: 640 | End: 2018-01-14
Attending: INTERNAL MEDICINE
Payer: COMMERCIAL

## 2018-01-14 ENCOUNTER — HOSPITAL ENCOUNTER (INPATIENT)
Age: 58
LOS: 2 days | Discharge: ANOTHER HOSPITAL | DRG: 640 | End: 2018-01-16
Attending: INTERNAL MEDICINE | Admitting: INTERNAL MEDICINE
Payer: COMMERCIAL

## 2018-01-14 DIAGNOSIS — F10.939 ALCOHOL WITHDRAWAL SYNDROME WITH COMPLICATION (HCC): Primary | ICD-10-CM

## 2018-01-14 PROBLEM — E87.6 HYPOKALEMIA: Status: ACTIVE | Noted: 2018-01-14

## 2018-01-14 LAB
ANION GAP SERPL CALC-SCNC: 10 MMOL/L (ref 5–15)
BASOPHILS # BLD: 0 K/UL (ref 0–0.1)
BASOPHILS NFR BLD: 0 % (ref 0–1)
BUN SERPL-MCNC: 23 MG/DL (ref 6–20)
BUN/CREAT SERPL: 21 (ref 12–20)
CALCIUM SERPL-MCNC: 8.5 MG/DL (ref 8.5–10.1)
CHLORIDE SERPL-SCNC: 84 MMOL/L (ref 97–108)
CO2 SERPL-SCNC: 35 MMOL/L (ref 21–32)
CREAT SERPL-MCNC: 1.11 MG/DL (ref 0.55–1.02)
EOSINOPHIL # BLD: 0 K/UL (ref 0–0.4)
EOSINOPHIL NFR BLD: 0 % (ref 0–7)
ERYTHROCYTE [DISTWIDTH] IN BLOOD BY AUTOMATED COUNT: 12.6 % (ref 11.5–14.5)
GLUCOSE SERPL-MCNC: 150 MG/DL (ref 65–100)
HCT VFR BLD AUTO: 32.5 % (ref 35–47)
HGB BLD-MCNC: 11.7 G/DL (ref 11.5–16)
LYMPHOCYTES # BLD: 1.4 K/UL (ref 0.8–3.5)
LYMPHOCYTES NFR BLD: 13 % (ref 12–49)
MCH RBC QN AUTO: 32.1 PG (ref 26–34)
MCHC RBC AUTO-ENTMCNC: 36 G/DL (ref 30–36.5)
MCV RBC AUTO: 89 FL (ref 80–99)
MONOCYTES # BLD: 0.7 K/UL (ref 0–1)
MONOCYTES NFR BLD: 6 % (ref 5–13)
NEUTS SEG # BLD: 8.5 K/UL (ref 1.8–8)
NEUTS SEG NFR BLD: 81 % (ref 32–75)
PLATELET # BLD AUTO: 202 K/UL (ref 150–400)
POTASSIUM SERPL-SCNC: 2.2 MMOL/L (ref 3.5–5.1)
RBC # BLD AUTO: 3.65 M/UL (ref 3.8–5.2)
SODIUM SERPL-SCNC: 129 MMOL/L (ref 136–145)
WBC # BLD AUTO: 10.6 K/UL (ref 3.6–11)

## 2018-01-14 PROCEDURE — 65610000006 HC RM INTENSIVE CARE

## 2018-01-14 PROCEDURE — 85025 COMPLETE CBC W/AUTO DIFF WBC: CPT | Performed by: INTERNAL MEDICINE

## 2018-01-14 PROCEDURE — 36415 COLL VENOUS BLD VENIPUNCTURE: CPT | Performed by: INTERNAL MEDICINE

## 2018-01-14 PROCEDURE — 82803 BLOOD GASES ANY COMBINATION: CPT

## 2018-01-14 PROCEDURE — 74018 RADEX ABDOMEN 1 VIEW: CPT

## 2018-01-14 PROCEDURE — 80048 BASIC METABOLIC PNL TOTAL CA: CPT | Performed by: INTERNAL MEDICINE

## 2018-01-14 PROCEDURE — 74011250636 HC RX REV CODE- 250/636: Performed by: INTERNAL MEDICINE

## 2018-01-14 PROCEDURE — 0DH673Z INSERTION OF INFUSION DEVICE INTO STOMACH, VIA NATURAL OR ARTIFICIAL OPENING: ICD-10-PCS | Performed by: INTERNAL MEDICINE

## 2018-01-14 PROCEDURE — 36600 WITHDRAWAL OF ARTERIAL BLOOD: CPT

## 2018-01-14 RX ORDER — TOPIRAMATE 100 MG/1
100 TABLET, FILM COATED ORAL 2 TIMES DAILY
Status: DISCONTINUED | OUTPATIENT
Start: 2018-01-15 | End: 2018-01-15

## 2018-01-14 RX ORDER — CHLORTHALIDONE 25 MG/1
25 TABLET ORAL DAILY
Status: DISCONTINUED | OUTPATIENT
Start: 2018-01-15 | End: 2018-01-15

## 2018-01-14 RX ORDER — POTASSIUM CHLORIDE 29.8 MG/ML
20 INJECTION INTRAVENOUS
Status: COMPLETED | OUTPATIENT
Start: 2018-01-14 | End: 2018-01-15

## 2018-01-14 RX ORDER — NEBIVOLOL 5 MG/1
20 TABLET ORAL DAILY
Status: DISCONTINUED | OUTPATIENT
Start: 2018-01-15 | End: 2018-01-16 | Stop reason: HOSPADM

## 2018-01-14 RX ORDER — POTASSIUM CHLORIDE 20MEQ/15ML
60 LIQUID (ML) ORAL DAILY
Status: DISCONTINUED | OUTPATIENT
Start: 2018-01-15 | End: 2018-01-16

## 2018-01-14 RX ORDER — SODIUM CHLORIDE 0.9 % (FLUSH) 0.9 %
5-10 SYRINGE (ML) INJECTION EVERY 8 HOURS
Status: DISCONTINUED | OUTPATIENT
Start: 2018-01-14 | End: 2018-01-16 | Stop reason: HOSPADM

## 2018-01-14 RX ORDER — ONDANSETRON 2 MG/ML
4 INJECTION INTRAMUSCULAR; INTRAVENOUS
Status: DISCONTINUED | OUTPATIENT
Start: 2018-01-14 | End: 2018-01-16 | Stop reason: HOSPADM

## 2018-01-14 RX ORDER — SODIUM CHLORIDE 0.9 % (FLUSH) 0.9 %
5-10 SYRINGE (ML) INJECTION AS NEEDED
Status: DISCONTINUED | OUTPATIENT
Start: 2018-01-14 | End: 2018-01-16 | Stop reason: HOSPADM

## 2018-01-14 RX ORDER — FLUTICASONE PROPIONATE 50 MCG
2 SPRAY, SUSPENSION (ML) NASAL DAILY
Status: DISCONTINUED | OUTPATIENT
Start: 2018-01-15 | End: 2018-01-16 | Stop reason: HOSPADM

## 2018-01-14 RX ORDER — AMLODIPINE BESYLATE 5 MG/1
10 TABLET ORAL DAILY
Status: DISCONTINUED | OUTPATIENT
Start: 2018-01-15 | End: 2018-01-16

## 2018-01-14 RX ORDER — SODIUM CHLORIDE 9 MG/ML
125 INJECTION, SOLUTION INTRAVENOUS CONTINUOUS
Status: DISCONTINUED | OUTPATIENT
Start: 2018-01-14 | End: 2018-01-16

## 2018-01-14 RX ADMIN — SODIUM CHLORIDE 125 ML/HR: 900 INJECTION, SOLUTION INTRAVENOUS at 22:02

## 2018-01-14 RX ADMIN — POTASSIUM CHLORIDE 20 MEQ: 400 INJECTION, SOLUTION INTRAVENOUS at 23:07

## 2018-01-14 RX ADMIN — Medication 10 ML: at 22:07

## 2018-01-14 RX ADMIN — POTASSIUM CHLORIDE 20 MEQ: 400 INJECTION, SOLUTION INTRAVENOUS at 22:02

## 2018-01-14 NOTE — IP AVS SNAPSHOT
1316 Irineo Damion [address] [place] [address] 
[phone] Patient: Anil Esposito MRN: [id number] :1960 About your hospitalization You were admitted on:  2018 You last received care in the:  [facility] 6S NEURO-SCI TELE You were discharged on:  2018 Why you were hospitalized Your primary diagnosis was:  Not on File Your diagnoses also included:  Hypokalemia Follow-up Information Follow up With Details Comments Contact Info Avery Brittle, MD Call As needed for primary care [address] [place] [address],[address] [address] [phone] Your Scheduled Appointments 2018  9:10 AM EST  
ESTABLISHED PATIENT with Avery Brittle, MD  
[facility] ([address] [address] [address] [phone] Discharge Orders None A check dafne indicates which time of day the medication should be taken. My Medications START taking these medications Instructions Each Dose to Equal  
 Morning Noon Evening Bedtime  
 folic acid 1 mg tablet Commonly known as:  Google Start taking on:  2018 Your last dose was: Your next dose is: Take 1 Tab by mouth daily. 1 mg LORazepam 1 mg tablet Commonly known as:  ATIVAN Your last dose was: Your next dose is:    
   
   
 Assess CIWA scale and take 1 Tab by mouth every one (1) hour as needed for a CIWA score of 8-11. Take 2 Tab by mouth every one (1) hour as needed for CIWA score >11  
     
   
   
   
  
 magnesium oxide 400 mg tablet Commonly known as:  MAG-OX Your last dose was: Your next dose is: Take 1 Tab by mouth two (2) times a day. 400 mg  
    
   
   
   
  
 therapeutic multivitamin tablet Commonly known as:  [facility] Start taking on:  2018 Your last dose was: Your next dose is: Take 1 Tab by mouth daily. 1 Tab  
    
   
   
   
  
 thiamine 100 mg tablet Commonly known as:  B-1 Start taking on:  1/17/2018 Your last dose was: Your next dose is: Take 1 Tab by mouth daily. 100 mg CONTINUE taking these medications Instructions Each Dose to Equal  
 Morning Noon Evening Bedtime ALPRAZolam 1 mg tablet Commonly known as:  [employer] Manual Your last dose was: Your next dose is: TAKE 1 TABLET BY MOUTH two TIMES DAILY AS NEEDED ANXIETY  
     
   
   
   
  
 amitriptyline 50 mg tablet Commonly known as:  ELAVIL Your last dose was: Your next dose is: Take 1 Tab by mouth nightly. Indications: NEUROPATHIC PAIN  
 50 mg  
    
   
   
   
  
 amLODIPine 10 mg tablet Commonly known as:  Wilber Roy Your last dose was: Your next dose is: Take 1 Tab by mouth daily. 10 mg  
    
   
   
   
  
 fluticasone 50 mcg/actuation nasal spray Commonly known as:  Marlaine Romance Your last dose was: Your next dose is: 2 Sprays by Both Nostrils route daily. 2 Spray  
    
   
   
   
  
 ibuprofen 600 mg tablet Commonly known as:  MOTRIN Your last dose was: Your next dose is: Take 1 Tab by mouth three (3) times daily as needed for Pain. Indications: replaces methadone 600 mg  
    
   
   
   
  
 levETIRAcetam 1,000 mg tablet Your last dose was: Your next dose is: Take 1 Tab by mouth two (2) times a day. Indications: TONIC-CLONIC EPILEPSY TREATMENT ADJUNCT  
 750 mg  
    
   
   
   
  
 nebivolol 20 mg tablet Commonly known as:  BYSTOLIC Your last dose was: Your next dose is: Take 1 Tab by mouth daily. Indications: hypertension 20 mg  
    
   
   
   
  
 promethazine 25 mg tablet Commonly known as:  PHENERGAN Your last dose was: Your next dose is:    
   
   
 1 po TID for nausea, migraine  
     
   
   
   
  
 topiramate 100 mg tablet Commonly known as:  TOPAMAX Your last dose was: Your next dose is: Take 1 Tab by mouth two (2) times a day. Indications: nerves and seizures 100 mg  
    
   
   
   
  
  
STOP taking these medications   
 chlorthalidone 25 mg tablet Commonly known as:  Allean Neff Where to Get Your Medications Information on where to get these meds will be given to you by the nurse or doctor. ! Ask your nurse or doctor about these medications  
  folic acid 1 mg tablet LORazepam 1 mg tablet  
 magnesium oxide 400 mg tablet  
 therapeutic multivitamin tablet  
 thiamine 100 mg tablet Discharge Instructions Discharging provider: Armando Gutierrez MD 
 
Primary care provider: Savi Tucker MD 
 
[facility] [place] COURSE: 
 
This 52-year-old white female presents to this facility as a transfer from [facility] after she presented there unresponsive after she was found on the floor by her family, face-up and breathing.  Unable to obtain any history from the patient.  Per chart review, it is unknown the amount of time that patient was unresponsive.  EMS gave Narcan, which had no effect.  In the emergency department there, she was found to have a potassium of less than 1.8 and was responsive to painful stimuli, but not verbal.  She did have stable vital signs and reactive pupils.  ED physician at the facility spoke to spouse and the spouse reports that the patient quit drinking alcohol approximately four days ago in which she has had an extensive alcohol consumption in which she would consume approximately a 12-pack of beer per day. Zachary Taylor noted is that the patient's spouse did see her approximately two hours prior to being found unresponsive. Acute metabolic encephalopathy (POA) - Patient with h/o alcohol abuse, found unresponsive and noted with pronounced electrolytes abnormalities. Unclear if there was a seizure or if this is mostly dehydration 
- CT head 1/14 without acute process 
- Urine drug screen with benzodiazapine, THC and tricyclics positive 
- Re-orient as able 
  
Chronic alcohol dependence/abuse - EtOH level <10 on presentation 
- Continue CIWA scoring with PRN ativan 
- Nutritional supplementation 
  
Hyponatremia (POA) - likely due to dehydration, sodium improving 
- Continue saline 
- Hold chlorthalidone - Monitor sodium 
  
Hypokalemia, hypophosphatemia, hypomagnesemia (POA) - monitor and replace as needed  
  
Hx of hepatitis C with transaminitis (POA) - EtOH not helping - Monitor liver function 
  
Seizure disorder (chronic) - Continue home keppra, topomax 
- Seizure precautions 
  
Report of hyperthyroidism - daughter tells me that this was diagnosed but her home doctor is avoiding treatment until the hepatitis C is dealt with (not an explanation that makes sense as methimazole can be given with liver dysfunction) - Checking TSH, T4 and T3 Marijuana dependence/abuse - Advised to quit 
  
Tobacco/cigarette dependence - Advised to quit 
  
Depression - continue routine meds 
  
Hypertension - continue home amlodipine, nevivolol. Holding chlorthalidone as above 
  
Anxiety disorder - continue PRN xanax, though this is a terrible medication for a chronic alcoholic. Restart amitriptyline FOLLOW-UP CARE RECOMMENDATIONS: 
 
APPOINTMENTS: 
Follow-up Information Follow up With Details Comments Contact Info Fidel Kinsey MD Call As needed for primary care 1000 [facility] [address] [place] [address],[address] Floor [facility] [phone] Future Appointments Date Time Provider Devante Conde 2/2/2018 9:10 AM [facility] It is very important that you keep follow-up appointment(s). Bring discharge papers, medication list (and/or medication bottles) to follow-up appointments for review by outpatient provider(s). FOLLOW-UP TESTS RECOMMENDED:  
· Monitor basic metabolic panel, phosphorus and magnesium levels · Monitor clinical intoxication and withdrawal assessment scores ONGOING TREATMENT PLAN: Correct lytes, monitor for withdrawal, treat as needed Specific symptoms to watch for: chest pain, shortness of breath, fever, chills, nausea, vomiting, diarrhea, change in mentation, falling, weakness, bleeding. DIET:  Regular Diet ACTIVITY:  Activity as tolerated and PT/OT Eval and Treat GOALS OF CARE: 
x  Eventual return to home/independent/assisted living Long term SNF Hospice No rehospitalization Patient condition at discharge:  
Functional status Poor   
x  Deconditioned Independent Cognition Lucid  
x  Forgetful (some sensescence) Dementia Catheters/lines (plus indication) Granados PICC   
  PEG Code status 
x  Full code DNR Carole Rao . . . . . . . . . . . . . . . . . . . . . . . . . . . . . . . . . . . . . . . . . . . . . . . . . . . . . . . . . . . . . . . . . . . . . . Carole Rao CHRONIC MEDICAL CONDITIONS: 
Problem List as of 1/16/2018  Date Reviewed: 12/8/2017 Codes Class Noted - Resolved Hypokalemia ICD-10-CM: E87.6 ICD-9-CM: 276.8  1/14/2018 - Present DJD (degenerative joint disease), cervical ICD-10-CM: M50.30 ICD-9-CM: 722.4  10/12/2017 - Present Chronic hepatitis C without hepatic coma (HCC) ICD-10-CM: B18.2 ICD-9-CM: 070.54  10/12/2017 - Present Seizure disorder ([address] ICD-10-CM: G40.909 ICD-9-CM: 345.90  10/12/2017 - Present ASCVD (arteriosclerotic cardiovascular disease) ICD-10-CM: I25.10 ICD-9-CM: 429.2, 440.9  10/12/2017 - Present Depression ICD-10-CM: F32.9 ICD-9-CM: 311  Unknown - Present Hypertension ICD-10-CM: I10 
ICD-9-CM: 401.9  Unknown - Present Chronic pain ICD-10-CM: G89.29 ICD-9-CM: 338.29  Unknown - Present Insomnia ICD-10-CM: G47.00 ICD-9-CM: 780.52  Unknown - Present Anxiety ICD-10-CM: F41.9 ICD-9-CM: 300.00  Unknown - Present [employer] Announcement We are excited to announce that we are making your provider's discharge notes available to you in KartRocket. You will see these notes when they are completed and signed by the physician that discharged you from your recent hospital stay. If you have any questions or concerns about any information you see in [employer], please call the Health Information Department where you were seen or reach out to your Primary Care Provider for more information about your plan of care. Introducing [facility]! [facility] introduces KartRocket patient portal. Now you can access parts of your medical record, email your doctor's office, and request medication refills online. 1. In your internet browser, go to [email or web address] 2. Click on the First Time User? Click Here link in the Sign In box. You will see the New Member Sign Up page. 3. Enter your KartRocket Access Code exactly as it appears below. You will not need to use this code after youve completed the sign-up process. If you do not sign up before the expiration date, you must request a new code. · KartRocket Access Code: [email or web address] Expires: 4/14/2018  9:08 PM 
 
4. Enter the last four digits of your Social Security Number (xxxx) and Date of Birth (mm/dd/yyyy) as indicated and click Submit. You will be taken to the next sign-up page. 5. Create a KartRocket ID. This will be your KartRocket login ID and cannot be changed, so think of one that is secure and easy to remember. 6. Create a KartRocket password. You can change your password at any time. 7. Enter your Password Reset Question and Answer. This can be used at a later time if you forget your password. 8. Enter your e-mail address. You will receive e-mail notification when new information is available in [address] 9. Click Sign Up. You can now view and download portions of your medical record. 10. Click the Download Summary menu link to download a portable copy of your medical information. If you have questions, please visit the Frequently Asked Questions section of the [employer] website. Remember, ComputeNext is NOT to be used for urgent needs. For medical emergencies, dial 911. Now available from your iPhone and Android! Providers Seen During Your Hospitalization Provider Specialty Primary office phone Velia Real MD Internal Medicine [phone] Jerod Bowen MD Internal Medicine [phone] Vasquez Blackwell MD Internal Medicine [phone] Immunizations Administered for This Admission Name Date Influenza Vaccine (Quad) PF  Deferred () Your Primary Care Physician (PCP) Primary Care Physician Office Phone Office Fax Jacques Gottron, [address] [phone] You are allergic to the following Allergen Reactions Labetalol Diarrhea Losartan Diarrhea GI Intolerance Recent Documentation Height Weight BMI OB Status Smoking Status 1.676 m 107.8 kg 38.37 kg/m2 Menopause Current Every Day Smoker Emergency Contacts Name Discharge Info Relation Home Work Mobile Oracio Bang DISCHARGE CAREGIVER [3] Son [22] [phone] Tam Bang N/A  AT THIS TIME [6] Friend [5] [phone] Patient Belongings The following personal items are in your possession at time of discharge: 
  Dental Appliances: None  Visual Aid: None      Home Medications: None   Jewelry: None  Clothing: None    Other Valuables: None Please provide this summary of care documentation to your next provider.  
  
  
 
  
Signatures-by signing, you are acknowledging that this After Visit Summary has been reviewed with you and you have received a copy. Patient Signature:  ____________________________________________________________ Date:  ____________________________________________________________  
  
Peggy Deiters Provider Signature:  ____________________________________________________________ Date:  ____________________________________________________________

## 2018-01-14 NOTE — IP AVS SNAPSHOT
2700 [facility] [address] [place] [address] 
[phone] Patient: Ronnie Cole MRN: [id number] :1960 A check dafne indicates which time of day the medication should be taken. My Medications START taking these medications Instructions Each Dose to Equal  
 Morning Noon Evening Bedtime  
 folic acid 1 mg tablet Commonly known as:  Geeta Start taking on:  2018 Your last dose was: Your next dose is: Take 1 Tab by mouth daily. 1 mg LORazepam 1 mg tablet Commonly known as:  ATIVAN Your last dose was: Your next dose is:    
   
   
 Assess CIWA scale and take 1 Tab by mouth every one (1) hour as needed for a CIWA score of 8-11. Take 2 Tab by mouth every one (1) hour as needed for CIWA score >11  
     
   
   
   
  
 magnesium oxide 400 mg tablet Commonly known as:  MAG-OX Your last dose was: Your next dose is: Take 1 Tab by mouth two (2) times a day. 400 mg  
    
   
   
   
  
 therapeutic multivitamin tablet Commonly known as:  [facility] Start taking on:  2018 Your last dose was: Your next dose is: Take 1 Tab by mouth daily. 1 Tab  
    
   
   
   
  
 thiamine 100 mg tablet Commonly known as:  B-1 Start taking on:  2018 Your last dose was: Your next dose is: Take 1 Tab by mouth daily. 100 mg CONTINUE taking these medications Instructions Each Dose to Equal  
 Morning Noon Evening Bedtime ALPRAZolam 1 mg tablet Commonly known as:  Sonya Davenport Your last dose was: Your next dose is: TAKE 1 TABLET BY MOUTH two TIMES DAILY AS NEEDED ANXIETY  
     
   
   
   
  
 amitriptyline 50 mg tablet Commonly known as:  ELAVIL Your last dose was: Your next dose is: Take 1 Tab by mouth nightly. Indications: NEUROPATHIC PAIN  
 50 mg  
    
   
   
   
  
 amLODIPine 10 mg tablet Commonly known as:  Edith Walsh Your last dose was: Your next dose is: Take 1 Tab by mouth daily. 10 mg  
    
   
   
   
  
 fluticasone 50 mcg/actuation nasal spray Commonly known as:  Carlito Finders Your last dose was: Your next dose is: 2 Sprays by Both Nostrils route daily. 2 Spray  
    
   
   
   
  
 ibuprofen 600 mg tablet Commonly known as:  MOTRIN Your last dose was: Your next dose is: Take 1 Tab by mouth three (3) times daily as needed for Pain. Indications: replaces methadone 600 mg  
    
   
   
   
  
 levETIRAcetam 1,000 mg tablet Your last dose was: Your next dose is: Take 1 Tab by mouth two (2) times a day. Indications: TONIC-CLONIC EPILEPSY TREATMENT ADJUNCT  
 750 mg  
    
   
   
   
  
 nebivolol 20 mg tablet Commonly known as:  BYSTOLIC Your last dose was: Your next dose is: Take 1 Tab by mouth daily. Indications: hypertension 20 mg  
    
   
   
   
  
 promethazine 25 mg tablet Commonly known as:  PHENERGAN Your last dose was: Your next dose is:    
   
   
 1 po TID for nausea, migraine  
     
   
   
   
  
 topiramate 100 mg tablet Commonly known as:  TOPAMAX Your last dose was: Your next dose is: Take 1 Tab by mouth two (2) times a day. Indications: nerves and seizures 100 mg  
    
   
   
   
  
  
STOP taking these medications   
 chlorthalidone 25 mg tablet Commonly known as:  Yeni Schwartz Where to Get Your Medications Information on where to get these meds will be given to you by the nurse or doctor. ! Ask your nurse or doctor about these medications  
  folic acid 1 mg tablet LORazepam 1 mg tablet  
 magnesium oxide 400 mg tablet therapeutic multivitamin tablet  
 thiamine 100 mg tablet

## 2018-01-15 LAB
ANION GAP SERPL CALC-SCNC: 7 MMOL/L (ref 5–15)
ARTERIAL PATENCY WRIST A: YES
BASE EXCESS BLD CALC-SCNC: 11 MMOL/L
BDY SITE: ABNORMAL
BUN SERPL-MCNC: 19 MG/DL (ref 6–20)
BUN/CREAT SERPL: 21 (ref 12–20)
CALCIUM SERPL-MCNC: 8.4 MG/DL (ref 8.5–10.1)
CHLORIDE SERPL-SCNC: 93 MMOL/L (ref 97–108)
CO2 SERPL-SCNC: 30 MMOL/L (ref 21–32)
CREAT SERPL-MCNC: 0.89 MG/DL (ref 0.55–1.02)
GAS FLOW.O2 O2 DELIVERY SYS: ABNORMAL L/MIN
GAS FLOW.O2 SETTING OXYMISER: 2 L/M
GLUCOSE SERPL-MCNC: 123 MG/DL (ref 65–100)
HCO3 BLD-SCNC: 35.1 MMOL/L (ref 22–26)
MAGNESIUM SERPL-MCNC: 2 MG/DL (ref 1.6–2.4)
O2/TOTAL GAS SETTING VFR VENT: 28 %
PCO2 BLD: 48.6 MMHG (ref 35–45)
PH BLD: 7.47 [PH] (ref 7.35–7.45)
PO2 BLD: 92 MMHG (ref 80–100)
POTASSIUM SERPL-SCNC: 3.5 MMOL/L (ref 3.5–5.1)
SAO2 % BLD: 97 % (ref 92–97)
SODIUM SERPL-SCNC: 130 MMOL/L (ref 136–145)
SPECIMEN TYPE: ABNORMAL
TOTAL RESP. RATE, ITRR: 28

## 2018-01-15 PROCEDURE — 80048 BASIC METABOLIC PNL TOTAL CA: CPT | Performed by: INTERNAL MEDICINE

## 2018-01-15 PROCEDURE — 65660000000 HC RM CCU STEPDOWN

## 2018-01-15 PROCEDURE — 74011250637 HC RX REV CODE- 250/637: Performed by: INTERNAL MEDICINE

## 2018-01-15 PROCEDURE — 83735 ASSAY OF MAGNESIUM: CPT | Performed by: INTERNAL MEDICINE

## 2018-01-15 PROCEDURE — 36415 COLL VENOUS BLD VENIPUNCTURE: CPT | Performed by: INTERNAL MEDICINE

## 2018-01-15 PROCEDURE — 74011250636 HC RX REV CODE- 250/636: Performed by: INTERNAL MEDICINE

## 2018-01-15 PROCEDURE — 92610 EVALUATE SWALLOWING FUNCTION: CPT | Performed by: SPEECH-LANGUAGE PATHOLOGIST

## 2018-01-15 RX ORDER — POTASSIUM CHLORIDE 29.8 MG/ML
20 INJECTION INTRAVENOUS
Status: COMPLETED | OUTPATIENT
Start: 2018-01-15 | End: 2018-01-15

## 2018-01-15 RX ORDER — THERA TABS 400 MCG
1 TAB ORAL DAILY
Status: DISCONTINUED | OUTPATIENT
Start: 2018-01-16 | End: 2018-01-16 | Stop reason: HOSPADM

## 2018-01-15 RX ORDER — LEVETIRACETAM 500 MG/1
1000 TABLET ORAL 2 TIMES DAILY
Status: DISCONTINUED | OUTPATIENT
Start: 2018-01-15 | End: 2018-01-16 | Stop reason: HOSPADM

## 2018-01-15 RX ORDER — LANOLIN ALCOHOL/MO/W.PET/CERES
100 CREAM (GRAM) TOPICAL DAILY
Status: DISCONTINUED | OUTPATIENT
Start: 2018-01-16 | End: 2018-01-16 | Stop reason: HOSPADM

## 2018-01-15 RX ORDER — ALPRAZOLAM 1 MG/1
1 TABLET ORAL
Status: DISCONTINUED | OUTPATIENT
Start: 2018-01-15 | End: 2018-01-16 | Stop reason: HOSPADM

## 2018-01-15 RX ORDER — FOLIC ACID 1 MG/1
1 TABLET ORAL DAILY
Status: DISCONTINUED | OUTPATIENT
Start: 2018-01-16 | End: 2018-01-16 | Stop reason: HOSPADM

## 2018-01-15 RX ORDER — TRIPROLIDINE/PSEUDOEPHEDRINE 2.5MG-60MG
400 TABLET ORAL
Status: DISCONTINUED | OUTPATIENT
Start: 2018-01-15 | End: 2018-01-16 | Stop reason: HOSPADM

## 2018-01-15 RX ORDER — LORAZEPAM 2 MG/ML
1 INJECTION INTRAMUSCULAR
Status: DISCONTINUED | OUTPATIENT
Start: 2018-01-15 | End: 2018-01-16 | Stop reason: HOSPADM

## 2018-01-15 RX ORDER — BACITRACIN 500 UNIT/G
PACKET (EA) TOPICAL
Status: DISPENSED
Start: 2018-01-15 | End: 2018-01-15

## 2018-01-15 RX ADMIN — TOPIRAMATE 100 MG: 25 TABLET, FILM COATED ORAL at 10:23

## 2018-01-15 RX ADMIN — Medication 10 ML: at 14:00

## 2018-01-15 RX ADMIN — IBUPROFEN 400 MG: 100 SUSPENSION ORAL at 12:05

## 2018-01-15 RX ADMIN — LEVETIRACETAM 1000 MG: 500 TABLET ORAL at 18:29

## 2018-01-15 RX ADMIN — SODIUM CHLORIDE 125 ML/HR: 900 INJECTION, SOLUTION INTRAVENOUS at 05:55

## 2018-01-15 RX ADMIN — NEBIVOLOL HYDROCHLORIDE 20 MG: 5 TABLET ORAL at 08:24

## 2018-01-15 RX ADMIN — Medication 10 ML: at 06:00

## 2018-01-15 RX ADMIN — SODIUM CHLORIDE 125 ML/HR: 900 INJECTION, SOLUTION INTRAVENOUS at 12:04

## 2018-01-15 RX ADMIN — TOPIRAMATE 100 MG: 25 TABLET, FILM COATED ORAL at 20:47

## 2018-01-15 RX ADMIN — POTASSIUM CHLORIDE 20 MEQ: 400 INJECTION, SOLUTION INTRAVENOUS at 01:21

## 2018-01-15 RX ADMIN — POTASSIUM CHLORIDE 20 MEQ: 400 INJECTION, SOLUTION INTRAVENOUS at 07:11

## 2018-01-15 RX ADMIN — POTASSIUM CHLORIDE 20 MEQ: 400 INJECTION, SOLUTION INTRAVENOUS at 05:57

## 2018-01-15 RX ADMIN — AMLODIPINE BESYLATE 10 MG: 5 TABLET ORAL at 08:24

## 2018-01-15 RX ADMIN — LEVETIRACETAM 750 MG: 100 SOLUTION ORAL at 18:00

## 2018-01-15 RX ADMIN — POTASSIUM CHLORIDE 60 MEQ: 40 SOLUTION ORAL at 08:24

## 2018-01-15 RX ADMIN — LEVETIRACETAM 750 MG: 100 SOLUTION ORAL at 08:47

## 2018-01-15 RX ADMIN — POTASSIUM CHLORIDE 20 MEQ: 400 INJECTION, SOLUTION INTRAVENOUS at 00:10

## 2018-01-15 RX ADMIN — Medication 10 ML: at 22:39

## 2018-01-15 RX ADMIN — ALPRAZOLAM 1 MG: 1 TABLET ORAL at 15:32

## 2018-01-15 NOTE — H&P
[address] [facility]  ACUTE CARE HISTORY AND PHYSICAL    Trev Casa.  MR#: [id number]  : 1960  ACCOUNT #: [de-identified]   DATE OF SERVICE: 2018    HISTORY OF PRESENT ILLNESS:  This 79-year-old white female presents to this facility as a transfer from [facility] after she presented there unresponsive after she was found on the floor by her family, face-up and breathing. Unable to obtain any history from the patient. Per chart review, it is unknown the amount of time that patient was unresponsive. EMS gave Narcan, which had no effect. In the emergency department there, she was found to have a potassium of less than 1.8 and was responsive to painful stimuli, but not verbal.  She did have stable vital signs and reactive pupils. ED physician at the facility spoke to spouse and the spouse reports that the patient quit drinking alcohol approximately four days ago in which she has had an extensive alcohol consumption in which she would consume approximately a 12-pack of beer per day. Also noted is that the patient's spouse did see her approximately two hours prior to being found unresponsive. PAST MEDICAL HISTORY:  1. Depression. 2.  Hepatitis C.  3.  Hypertension. 4.  Insomnia. 5.  Anxiety disorder. 6.  Chronic alcohol dependence/abuse. 7.  Seizure disorder. PAST SURGICAL HISTORY:  Clavoid steal, surgical rerouted artery. MEDICATIONS:  Alprazolam 1 mg p.o. b.i.d. p.r.n., amitriptyline 50 mg p.o. at bedtime, amlodipine 10 mg p.o. every day, chlorthalidone 25 mg p.o. every day, Flonase 50 mcg two sprays both nostrils every day, ibuprofen 600 mg p.o. t.i.d. p.r.n., Keppra 1000 mg b.i.d., nebivolol 20 mg p.o. every day, promethazine 25 mg p.o. t.i.d. and topiramate 100 mg p.o. b.i.d. ALLERGIES:  LABETALOL, LOSARTAN; neither are true allergies as both of them are said to have caused diarrhea.     FAMILY HISTORY:  Noncontributory to presentation. SOCIAL HISTORY:  The patient is  and lives with spouse. She has an extensive alcohol consumption history, as mentioned above, in which she consumed approximately one 12-pack of beer per day and spouse tells us she quit drinking approximately four days ago. Per chart review, she does smoke marijuana as well as consume tobacco.    REVIEW OF SYSTEMS:  Unable to obtain. PHYSICAL EXAMINATION:  GENERAL:  White female lying in bed, no acute cardiopulmonary distress. VITAL SIGNS:  Pulse 72, respirations 23, blood pressure 123/57, O2 saturation 94% on room air. HEAD:  Normocephalic, atraumatic. No maxillary frontal tenderness. EYES:  Pupils equal, round, react to light. Anicteric sclerae. Conjunctivae pink, moist.    NOSE:  No deviated or perforated septum, no nasal discharge. MOUTH/THROAT:  Oral mucosa pink, moist. Missing dentition. Nonerythematous throat. No ulcers and no lesions. CARDIOVASCULAR:  Regular rate and rhythm. S1, S2 audible. No murmurs, gallops or rubs. No edema, no JVD. No carotid or abdominal bruits. Radial and dorsalis pedis pulses 2+ bilaterally. LUNGS:  Clear to auscultation bilaterally. No wheezes, rales or rhonchi. ABDOMEN:  Bowel sounds present, soft, nontender, nondistended. No guarding, rebound or hepatosplenomegaly. MUSCULOSKELETAL:  Unable to fully assess range of motion of the extremities. No gross muscle atrophy noted. NEUROLOGIC:  The patient is unresponsive to verbal or tactile stimuli. Cranial nerves 2-12 grossly intact. Unable to ascertain muscle strength. SKIN:  No rashes or pallor. ENDOCRINE:  No thyromegaly. LYMPHATICS:  No cervical, axillary or inguinal lymphadenopathy. LABORATORY DATA:    CBC with differential:  WBC 9.3, hemoglobin 12.7, hematocrit 35.4, platelets 896. Neutrophils 80%, lymphocytes 12%, monocytes 7%, eosinophils 1%, basophils 1%.       CMP:  Sodium 125, potassium less than 1.8, chloride 85, bicarbonate 30, anion gap 10, BUN 27, creatinine 1.38, glucose 192. Calcium 9. Total bilirubin 0.3, total protein 7.4, albumin 3.7, , , alkaline phosphatase 112. Urinalysis:  Color is dark yellow, appearance is hazy, specific gravity 1.015, pH 5.5, protein negative, glucose negative, ketones negative, blood negative, bilirubin negative, urobilinogen 0.2, nitrites negative, leukocyte esterase negative, epithelial cells few, WBC's 0-4 per high power field, RBC's 0-5 per high power field, bacteria negative, amorphous crystals 1+, hyaline casts 0-2. Lactic acid 0.5. Magnesium 2. HEAD CT SCAN:  CT of the head without contrast per radiology shows normal noncontrast CT scan of the head. CHEST X-RAY:  Chest x-ray per radiology shows no acute process. ASSESSMENT AND PLAN:  This is a 69-year-old, white female with acute metabolic encephalopathy and severe hypokalemia. PROBLEM LIST:  1. Acute metabolic encephalopathy. 2.  Hypokalemia. 3.  Transaminitis. 4.  Marijuana dependence/abuse. 5.  Chronic alcohol dependence/abuse. 6.  Tobacco/cigarette dependence. 7.  Seizure disorder. 8.  Hepatitis C.  9.  Depression. 10.  Hypertension. 11. Anxiety disorder. PLAN:  The patient has been admitted to the ICU. She has received only 30 mEq of potassium since being in the emergency department at that facility and her time arriving to this facility. I will place electrolyte replacement protocol. NG tube will be placed and she will be given 60 mEq one time now. We will place serial BMP's and continue to replace potassium. EKG done at [facility], which cannot be visually seen at this time, but report shows sinus rhythm with fusion complexes and a possible left atrial enlargement with left axis deviation, nonspecific intraventricular conduction delay, marked ST abnormality, possible lateral subendocardial injury, and prolonged QT.   Unsure as to how her potassium is as low as it is. Unsure if this is related to her alcohol consumption. The patient could have had a seizure; however, this does not explain her hypokalemia. We will give her medications for her comorbidities per NG tube with the exception of her amitriptyline, which is held as there is consideration that she might have taken too many. We will continue Keppra for her seizure disorder. We will continue amlodipine, nebivolol, and chlorthalidone for hypertension. We will hold her Xanax at this time. We will place SCD hose for DVT prophylaxis. We will place strict I's and O's. The patient will have continuous neural checks per ICU. CODE STATUS:  FULL CODE. TIME SPENT:  Total time for admission 75 minutes.       Angelo Rodríguez       OB / RN  D: 01/15/2018 00:12     T: 01/15/2018 01:22  JOB #: [id number]

## 2018-01-15 NOTE — PROGRESS NOTES
2115: pt arrived via stretcher from [address] [place] [address] transport. Placed on bed. pt lethargic, mumbles when asked questions, incomprehensible speech. Primary Nurse Jr Robles RN and Cassandra Saldivar RN performed a dual skin assessment on this patient No impairment noted. Rajeev score is 16. Clarified that KAMI Dial is the patients EX-. They still live together with their son Verenice Oliva said that he had paperwork to prove he was the power of . None has been provided at this point. Admitting called to notify me that we do not except p'ts insurance. 0000: pt more awake, asking questions about what happened. Able to respond to questions, but then goes back to sleep.    0400: pt easily arousable, speech clear, falls back to sleep easily.    0800: report given to Laura Hernandez Rn

## 2018-01-15 NOTE — CDMP QUERY
Dr. Megan Villaseñor,     Please clarify if this patient is being treated/managed for:    =>Hyponatremia in the setting of serum sodium level of 125/129/130 requiring serial lab monitoring and IVFs  =>Other Explanation of clinical findings  =>Unable to Determine (no explanation of clinical findings)    The medical record reflects the following clinical findings, treatment, and risk factors:    Risk Factors: 64 y/o pt  Clinical Indicators: Na+ 125/129/130  Treatment: IVFs, serial lab monitoring    Please clarify and document your clinical opinion in the progress notes and discharge summary including the definitive and/or presumptive diagnosis, (suspected or probable), related to the above clinical findings. Please include clinical findings supporting your diagnosis.     Thank you,   Abbey Francer, [address]

## 2018-01-15 NOTE — PROGRESS NOTES
0800 Bedside and Verbal shift change report given to Chiqui Mcfarland RN (oncoming nurse) by Marlena Odonnell RN (offgoing nurse). Report included the following information SBAR, Kardex, ED Summary, Procedure Summary, Intake/Output, MAR and Recent Results. 0800 Assumed care of pt. Pt A&Ox4, following commands, drowsy. 0930 Granados D/C'd. Attempted PIV access x2, unable to obtain.

## 2018-01-15 NOTE — ROUTINE PROCESS
Primary Nurse Sandra Reveles and Mishel Day, RN, RN performed a dual skin assessment on this patient No impairment noted  Rajeev score is 19

## 2018-01-15 NOTE — PROGRESS NOTES
Chart reviewed. CM spoke with nurse manager on ICU. [facility] does not accept the [employer] and patient will need to transfer to another hospital. CM met with patient and sister Jack Piña at bedside. Patient is agreeable to transfer. CM called [facility] transfer center ([phone]) and spoke with Pippa Borges. Pippa Cainissac stated that she has to reach out to an MD with [facility] who will be contacting our hospitalist to discuss the transfer. Deviwindy Katerina will follow up with this CM to discuss details after MD's discussion regarding transfer. CM informed hospitalist of same. Will continue to follow. 10:10 AM: Spoke with hospitalist who spoke with the MD at [facility], they are waiting for a bed to be available. Patient's sister requested financial assistance application, this CM provided care card application to patient. Sister will ride in ambulance with patient for transfer. CM will continue to follow to coordinate transfer. Update 3:20 PM: CM called [facility] transfer center ([phone]) and spoke with John Maxwell to follow-up on status of transfer. John Maxwell stated they are waiting to hear from hospitalist at [facility] to find out if patient can transfer to [facility] since [facility] has no beds available. CM notified MD that transfer center is waiting to speak with him. CM provided transfer center with 6S unit phone number to contact in case transfer occurs after hours. If transfer occurs after hours today, unit staff to contact on-call  to set up transportation for transfer. Patient needs ambulance transport and sister wants to ride in ambulance with the patient. RN is aware. 4:15 PM: CM notified ROXI/on-call  of pending transfer to [facility].      Jose Grider, BSW/CRM

## 2018-01-15 NOTE — PROGRESS NOTES
Speech Pathology bedside swallow evaluation/discharge  Patient: Adamaris Kenny (55 y.o. female)  Date: 1/15/2018  Primary Diagnosis: severe hypokalemia/tricyclic OD  Hypokalemia        Precautions: fall       ASSESSMENT :  Based on the objective data described below, the patient presents with no oral or pharyngeal dysphagia for thin liquids and purees. Patient refused solids. Tolerated thin liquids via large successive straw sips and purees without s/s of aspiration. Complains of globus sensation with solid foods and family present bedside reports she has not been able to swallow solids for weeks. Reports repeated vomiting for weeks as well. Suspect possible GI etiology to complaints. Family reports they have been trying to schedule a GI consult as an OP, however, have not been able to get an appointment yet. Skilled therapy provided by a speech-language pathologist is not indicated at this time. PLAN :  Recommendations:  --full liquid diet per patient request.  Can upgrade to regular at patient request as tolerated. Would consider GI consult as inpatient or outpatient for reports of globus sensation and vomiting for multiple weeks PTA. No further SLP needs. Discharge Recommendations: None     SUBJECTIVE:   Patient stated I can't swallow real food, all I can drink is water. Family member at bedside and reports this has been occurring for weeks with frequent vomiting. Has been trying to get an OP GI consult but \"they haven't been doing anything fast enough so she passed out and now we're here\".     OBJECTIVE:     Past Medical History:   Diagnosis Date    Alcoholism ([address]     Anxiety     Chronic pain     Depression     Hepatitis C     Hypertension     Insomnia      Past Surgical History:   Procedure Laterality Date    VASCULAR SURGERY PROCEDURE UNLIST      clavoid steel surgery rerouted artery     Prior Level of Function/Home Situation:   Home Situation  Home Environment: Private residence  One/Two Story Residence: One story  Living Alone: No  Support Systems: None  Patient Expects to be Discharged to[de-identified] Private residence  Current DME Used/Available at Home: None  Diet prior to admission: full liquids   Current Diet:  Regular    Cognitive and Communication Status:  Neurologic State: Alert  Orientation Level: Oriented X4  Cognition: Follows commands  Perception: Appears intact  Perseveration: No perseveration noted  Safety/Judgement: Not assessed  Oral Assessment:  Oral Assessment  Labial: No impairment  Oral Hygiene: moist mucosa   Lingual: No impairment  Mandible: No impairment  P.O. Trials:  Patient Position: upright in bed   Vocal quality prior to P.O.: No impairment  Consistency Presented: Thin liquid;Puree (refused solids )  How Presented: SLP-fed/presented;Self-fed/presented;Straw;Successive swallows;Spoon     Bolus Acceptance: No impairment  Bolus Formation/Control: No impairment     Propulsion: No impairment  Oral Residue: None  Initiation of Swallow: No impairment  Laryngeal Elevation: Functional  Aspiration Signs/Symptoms: None  Pharyngeal Phase Characteristics: No impairment, issues, or problems         Comments: facial grimacing with swallowing, reports sore throat. ?related to NG tube? Oral Phase Severity: No impairment  Pharyngeal Phase Severity : No impairment    G Codes: In compliance with CMSs Claims Based Outcome Reporting, the following G-code set was chosen for this patient based the use of the NOMS functional outcome to quantify this patient's level of swallowing impairment. Using the NOMS, the patient was determined to be at level 7 for swallow function which correlates with the CH= 0% level of severity.     Based on the objective assessment provided within this note, the current, goal, and discharge g-codes are as follows:    Swallow  Swallowing:   Swallow Current Status CH= 0%   Swallow Goal Status CH= 0%   Swallow D/C Status CH= 0%      NOMS Swallowing Levels:  Level 1 (CN): NPO  Level 2 (CM): NPO but takes consistency in therapy  Level 3 (CL): Takes less than 50% of nutrition p.o. and continues with nonoral feedings; and/or safe with mod cues; and/or max diet restriction  Level 4 (CK): Safe swallow but needs mod cues; and/or mod diet restriction; and/or still requires some nonoral feeding/supplements  Level 5 (CJ): Safe swallow with min diet restriction; and/or needs min cues  Level 6 (CI): Independent with p.o.; rare cues; usually self cues; may need to avoid some foods or needs extra time  Level 7 ([address] [place] [address]): Independent for all p.o.  KIMBER. (2003). National Outcomes Measurement System (NOMS): Adult Speech-Language Pathology User's Guide. Pain:  Pain Scale 1: Numeric (0 - 10)  Pain Intensity 1: 0     After treatment:   [] Patient left in no apparent distress sitting up in chair  [x] Patient left in no apparent distress in bed  [x] Call bell left within reach  [x] Nursing notified  [x] Caregiver present  [] Bed alarm activated    COMMUNICATION/EDUCATION:   The patients plan of care including findings, recommendations, and recommended diet changes were discussed with: Registered Nurse. [x] Patient/family have participated as able and agree with findings and recommendations. [] Patient is unable to participate in plan of care at this time. Thank you for this referral.  Raymond Dodge M.CD.  CCC-SLP   Time Calculation: 11 mins

## 2018-01-15 NOTE — PROGRESS NOTES
0800 Bedside and Verbal shift change report given to Jasmin Trent RN (oncoming nurse) by Jodean Holstein, RN (offgoing nurse). Report included the following information SBAR, Kardex, ED Summary, Procedure Summary, Intake/Output, MAR and Recent Results. 0800 Assumed care of pt. Pt A&Ox4, following commands, drowsy. 5023 Pt passed bedside STAND per off-going RN, pt drowsy, states she has been having difficulty swallowing \"for seven months\" meds administered via NGT.  Antonietta Hinds updated on pt status. Orders received for SLP/PT, regular diet. 0930 Granados D/C'd. Attempted PIV access x2, unable to obtain. 0945 Pt received breakfast tray, states she is having difficulty swallowing, asked for NGT to be removed. RN reinforced education that pt is to remain NPO until speech evaluation by SLP and NGT to stay in place for medication administration. Pt verbalized understanding. [facility] Dr. Carlos Holguin updated on pt status. Notified that pt c/o pain, previous methadone pt & was weaned off \"a month ago. \" Pt states she takes ibuprofen at home- OK to restart ibuprofen q4h PRN. Pt also requesting PTA Xanax. Per MD- OK to resume PTA Xanax AFTER pt has been assessed by SLP. SLP aware of pt consult and will be in to see. 1100 RN in to administer Ibuprofen; pt resting comfortably; pt's family requesting not to disturb pt at this time. 1115 PICC team notified of poor IV access, femoral line infusing that MD would like removed. Will up to attempt access when able. 1120 SLP at bedside. Pt cleared for clear liquids. 1215 PICC RN at bedside to attempt IV access. [phone] PICC RN unable to establish PIV access. 1339 TRANSFER - OUT REPORT:    Verbal report given to [facility], RN(name) on Manjula Falcon  being transferred to N[facility]U(unit) for routine progression of care       Report consisted of patients Situation, Background, Assessment and   Recommendations(SBAR).      Information from the following report(s) SBAR, Kardex, ED Summary, Procedure Summary, Intake/Output, MAR and Recent Results was reviewed with the receiving nurse. Lines:   Triple Lumen TLC Right femoral line 01/14/18 Right; Lower Femoral (Active)   Central Line Being Utilized Yes 1/15/2018 12:00 PM   Criteria for Appropriate Use Limited/no vessel suitable for conventional peripheral access 1/15/2018 12:00 PM   Site Assessment Clean, dry, & intact 1/15/2018 12:00 PM   Infiltration Assessment 0 1/15/2018 12:00 PM   Affected Extremity/Extremities Pulses palpable;Color distal to insertion site pink (or appropriate for race); Range of motion performed 1/15/2018 12:00 PM   Date of Last Dressing Change 01/14/18 1/15/2018 12:00 PM   Dressing Status Clean, dry, & intact 1/15/2018 12:00 PM   Dressing Type Transparent 1/15/2018 12:00 PM   Proximal Hub Color/Line Status White 1/15/2018 12:00 PM   Positive Blood Return (Medial Site) Yes 1/15/2018 12:00 PM   Medial Hub Color/Line Status Blue 1/15/2018 12:00 PM   Positive Blood Return (Lateral Site) Yes 1/15/2018 12:00 PM   Distal Hub Color/Line Status Brown 1/15/2018 12:00 PM   Positive Blood Return (Site #3) Yes 1/15/2018 12:00 PM   Alcohol Cap Used Yes 1/15/2018 12:00 PM        Opportunity for questions and clarification was provided.       Patient transported with:   [employer]

## 2018-01-15 NOTE — PROGRESS NOTES
Hospitalist Progress Note  Jerod Bowen MD  Answering service: [phone] OR [phone] from in house phone      Date of Service:  1/15/2018  NAME:  Lilian Clark  :  1960  MRN:  [id number]      Admission Summary: This 52-year-old white female presents to this facility as a transfer from [facility] after she presented there unresponsive after she was found on the floor by her family, face-up and breathing. Unable to obtain any history from the patient. Per chart review, it is unknown the amount of time that patient was unresponsive. EMS gave Narcan, which had no effect. In the emergency department there, she was found to have a potassium of less than 1.8 and was responsive to painful stimuli, but not verbal.  She did have stable vital signs and reactive pupils. ED physician at the facility spoke to spouse and the spouse reports that the patient quit drinking alcohol approximately four days ago in which she has had an extensive alcohol consumption in which she would consume approximately a 12-pack of beer per day. Also noted is that the patient's spouse did see her approximately two hours prior to being found unresponsive. Interval history / Subjective:   Patient just transferred from icu,saying that she is feeling fine. She claims that she does not know what happened to her. Assessment & Plan:     1. Acute metabolic encephalopathy  -Patient with h/o alcohol abuse,found unresponsive and noted with pronounced electrolytes abnormalities. K<1.80,Na 125,bun 27,creat 1.38  -Was admitted to icu. Has been hydrated and electrolytes corrected. -Her mentation significantly improved,now awake,alert,oriented,interacting well. -Will follow electrolytes    2. Hypokalemia. -Repleted    3. Transaminitis. -Due to alcohol abuse on top of hepatitis C  -Encouraged to quit alcohol    4. Marijuana dependence/abuse.   -Advised to quit    5. Chronic alcohol dependence/abuse. -Monitor for withdrawal  -MVI,thiamine,folic acid. 6.  Tobacco/cigarette dependence. -Advised to quit    7. Seizure disorder.  -No seizure reported - On keppra  8. Hepatitis C.  9.  Depression - continue routine meds  10. Hypertension - continue routine meds  11. Anxiety disorder    Code status:full code  DVT prophylaxis:sc heparin    Care Plan discussed with:,patient  Disposition:Patient was supposed to be transferred to the [facility] because her insurance only will work in [facility] hospital by the [facility] has not accepted the transfer. Hospital Problems  Date Reviewed: 12/8/2017          Codes Class Noted POA    Hypokalemia ICD-10-CM: E87.6  ICD-9-CM: 276.8  1/14/2018 Unknown                Review of Systems:   A comprehensive review of systems was negative except for that written in the HPI. Vital Signs:    Last 24hrs VS reviewed since prior progress note. Most recent are:  Visit Vitals    /81 (BP 1 Location: Right arm, BP Patient Position: At rest)    Pulse 75    Temp 98.3 °F (36.8 °C)    Resp 17    Ht 5' 5.98\" (1.676 m)    SpO2 98%    BMI 17.76 kg/m2         Intake/Output Summary (Last 24 hours) at 01/15/18 1724  Last data filed at 01/15/18 1200   Gross per 24 hour   Intake          2315.83 ml   Output             1150 ml   Net          1165.83 ml        Physical Examination:             Constitutional:  No acute distress, cooperative, pleasant    ENT:  Oral mucous moist, oropharynx benign. Neck supple,    Resp:  CTA bilaterally. No wheezing/rhonchi/rales. No accessory muscle use   CV:  Regular rhythm, normal rate, no murmurs, gallops, rubs    GI:  Soft, non distended, non tender. normoactive bowel sounds, no hepatosplenomegaly     Musculoskeletal:  No edema, warm, 2+ pulses throughout    Neurologic:  Moves all extremities.   AAOx3, CN II-XII reviewed            Data Review:    Review and/or order of clinical lab test      Labs:     Recent Labs      01/14/18   2205  01/14/18   1515   WBC  10.6  9.3   HGB  11.7  12.7   HCT  32.5*  35.4   PLT  202  236     Recent Labs      01/15/18   0252  01/14/18   2150  01/14/18   1515   NA  130*  129*  125*   K  3.5  2.2*  <1.8*   CL  93*  84*  85*   CO2  30  35*  30   BUN  19  23*  27*   CREA  0.89  1.11*  1.38*   GLU  123*  150*  192*   CA  8.4*  8.5  9.0   MG  2.0   --   2.0     Recent Labs      01/14/18   1515   SGOT  125*   ALT  146*   AP  112   TBILI  0.3   TP  7.4   ALB  3.7   GLOB  3.7     No results for input(s): INR, PTP, APTT in the last 72 hours. No lab exists for component: INREXT   No results for input(s): FE, TIBC, PSAT, FERR in the last 72 hours. No results found for: FOL, RBCF   No results for input(s): PH, PCO2, PO2 in the last 72 hours.   Recent Labs      01/14/18   1515   CPK  22*   CKNDX  Cannot be calculated   TROIQ  0.08*     Lab Results   Component Value Date/Time    Cholesterol, total 213 10/12/2017 10:27 AM    HDL Cholesterol 80 10/12/2017 10:27 AM    LDL, calculated 100 10/12/2017 10:27 AM    Triglyceride 163 10/12/2017 10:27 AM     No results found for: GLUCPOC  Lab Results   Component Value Date/Time    Color DARK YELLOW 01/14/2018 03:15 PM    Appearance HAZY 01/14/2018 03:15 PM    Specific gravity 1.015 01/14/2018 03:15 PM    pH (UA) 5.5 01/14/2018 03:15 PM    Protein NEGATIVE  01/14/2018 03:15 PM    Glucose NEGATIVE  01/14/2018 03:15 PM    Ketone NEGATIVE  01/14/2018 03:15 PM    Urobilinogen 0.2 01/14/2018 03:15 PM    Nitrites NEGATIVE  01/14/2018 03:15 PM    Leukocyte Esterase NEGATIVE  01/14/2018 03:15 PM    Epithelial cells FEW 01/14/2018 03:15 PM    Bacteria NEGATIVE  01/14/2018 03:15 PM    WBC 0-4 01/14/2018 03:15 PM    RBC 0-5 01/14/2018 03:15 PM         Medications Reviewed:     Current Facility-Administered Medications   Medication Dose Route Frequency    levETIRAcetam (KEPPRA) oral solution 750 mg  750 mg Per NG tube BID  topiramate (TOPAMAX) 6 mg/mL oral suspension (compounded) 100 mg  100 mg Per NG tube BID    bacitracin 500 unit/gram packet        ibuprofen (ADVIL;MOTRIN) 100 mg/5 mL oral suspension 400 mg  400 mg Per NG tube Q4H PRN    ALPRAZolam (XANAX) tablet 1 mg  1 mg Oral BID PRN    sodium chloride (NS) flush 5-10 mL  5-10 mL IntraVENous Q8H    sodium chloride (NS) flush 5-10 mL  5-10 mL IntraVENous PRN    ondansetron (ZOFRAN) injection 4 mg  4 mg IntraVENous Q4H PRN    0.9% sodium chloride infusion  125 mL/hr IntraVENous CONTINUOUS    potassium chloride (KAON 10%) 20 mEq/15 mL oral liquid 60 mEq  60 mEq Per NG tube DAILY    influenza vaccine 2017-18 (3 yrs+)(PF) (FLUZONE QUAD/FLUARIX QUAD) injection 0.5 mL  0.5 mL IntraMUSCular PRIOR TO DISCHARGE    amLODIPine (NORVASC) tablet 10 mg  10 mg Per NG tube DAILY    fluticasone (FLONASE) 50 mcg/actuation nasal spray 2 Spray  2 Spray Both Nostrils DAILY    nebivolol (BYSTOLIC) tablet 20 mg  20 mg Oral DAILY     ______________________________________________________________________  EXPECTED LENGTH OF STAY: 3d 7h  ACTUAL LENGTH OF STAY:          1                 oJhn Vázquez MD

## 2018-01-15 NOTE — PROGRESS NOTES
Problem: Falls - Risk of  Goal: *Absence of Falls  Document Tessie Fall Risk and appropriate interventions in the flowsheet. Outcome: Progressing Towards Goal  Fall Risk Interventions:  Mobility Interventions: Patient to call before getting OOB    Mentation Interventions: Bed/chair exit alarm, More frequent rounding    Medication Interventions: Patient to call before getting OOB    Elimination Interventions: Call light in reach, Toileting schedule/hourly rounds    History of Falls Interventions: Bed/chair exit alarm, Room close to nurse's station        Comments: Problem: Falls - Risk of  Goal: *Absence of Falls  Document Tessie Fall Risk and appropriate interventions in the flowsheet.    Outcome: Progressing Towards Goal  Fall Risk Interventions:  Mobility Interventions: Patient to call before getting OOB     Mentation Interventions: Bed/chair exit alarm, More frequent rounding     Medication Interventions: Patient to call before getting OOB     Elimination Interventions: Call light in reach, Toileting schedule/hourly rounds     History of Falls Interventions: Bed/chair exit alarm, Room close to nurse's station

## 2018-01-16 VITALS
BODY MASS INDEX: 38.18 KG/M2 | DIASTOLIC BLOOD PRESSURE: 68 MMHG | TEMPERATURE: 98.1 F | HEIGHT: 66 IN | WEIGHT: 237.6 LBS | SYSTOLIC BLOOD PRESSURE: 137 MMHG | HEART RATE: 79 BPM | OXYGEN SATURATION: 99 % | RESPIRATION RATE: 20 BRPM

## 2018-01-16 LAB
ALBUMIN SERPL-MCNC: 2.9 G/DL (ref 3.5–5)
ALBUMIN/GLOB SERPL: 0.8 {RATIO} (ref 1.1–2.2)
ALP SERPL-CCNC: 93 U/L (ref 45–117)
ALT SERPL-CCNC: 72 U/L (ref 12–78)
ANION GAP SERPL CALC-SCNC: 9 MMOL/L (ref 5–15)
AST SERPL-CCNC: 38 U/L (ref 15–37)
BASOPHILS # BLD: 0 K/UL (ref 0–0.1)
BASOPHILS NFR BLD: 0 % (ref 0–1)
BILIRUB SERPL-MCNC: 0.5 MG/DL (ref 0.2–1)
BUN SERPL-MCNC: 6 MG/DL (ref 6–20)
BUN/CREAT SERPL: 10 (ref 12–20)
CALCIUM SERPL-MCNC: 8.1 MG/DL (ref 8.5–10.1)
CHLORIDE SERPL-SCNC: 98 MMOL/L (ref 97–108)
CO2 SERPL-SCNC: 24 MMOL/L (ref 21–32)
CREAT SERPL-MCNC: 0.61 MG/DL (ref 0.55–1.02)
EOSINOPHIL # BLD: 0.1 K/UL (ref 0–0.4)
EOSINOPHIL NFR BLD: 1 % (ref 0–7)
ERYTHROCYTE [DISTWIDTH] IN BLOOD BY AUTOMATED COUNT: 12.9 % (ref 11.5–14.5)
GLOBULIN SER CALC-MCNC: 3.5 G/DL (ref 2–4)
GLUCOSE SERPL-MCNC: 98 MG/DL (ref 65–100)
HCT VFR BLD AUTO: 30.9 % (ref 35–47)
HGB BLD-MCNC: 10.8 G/DL (ref 11.5–16)
LYMPHOCYTES # BLD: 1.4 K/UL (ref 0.8–3.5)
LYMPHOCYTES NFR BLD: 14 % (ref 12–49)
MAGNESIUM SERPL-MCNC: 1.3 MG/DL (ref 1.6–2.4)
MCH RBC QN AUTO: 31.9 PG (ref 26–34)
MCHC RBC AUTO-ENTMCNC: 35 G/DL (ref 30–36.5)
MCV RBC AUTO: 91.2 FL (ref 80–99)
MONOCYTES # BLD: 0.6 K/UL (ref 0–1)
MONOCYTES NFR BLD: 6 % (ref 5–13)
NEUTS SEG # BLD: 7.8 K/UL (ref 1.8–8)
NEUTS SEG NFR BLD: 79 % (ref 32–75)
PHOSPHATE SERPL-MCNC: 2 MG/DL (ref 2.6–4.7)
PLATELET # BLD AUTO: 231 K/UL (ref 150–400)
POTASSIUM SERPL-SCNC: 2.8 MMOL/L (ref 3.5–5.1)
PROT SERPL-MCNC: 6.4 G/DL (ref 6.4–8.2)
RBC # BLD AUTO: 3.39 M/UL (ref 3.8–5.2)
SODIUM SERPL-SCNC: 131 MMOL/L (ref 136–145)
T3FREE SERPL-MCNC: 2.6 PG/ML (ref 2.2–4)
T4 SERPL-MCNC: 7.7 UG/DL (ref 4.8–13.9)
WBC # BLD AUTO: 10 K/UL (ref 3.6–11)

## 2018-01-16 PROCEDURE — 74011250636 HC RX REV CODE- 250/636: Performed by: HOSPITALIST

## 2018-01-16 PROCEDURE — 80053 COMPREHEN METABOLIC PANEL: CPT | Performed by: INTERNAL MEDICINE

## 2018-01-16 PROCEDURE — 74011250636 HC RX REV CODE- 250/636: Performed by: INTERNAL MEDICINE

## 2018-01-16 PROCEDURE — 84436 ASSAY OF TOTAL THYROXINE: CPT | Performed by: HOSPITALIST

## 2018-01-16 PROCEDURE — 74011250637 HC RX REV CODE- 250/637: Performed by: INTERNAL MEDICINE

## 2018-01-16 PROCEDURE — 84481 FREE ASSAY (FT-3): CPT | Performed by: HOSPITALIST

## 2018-01-16 PROCEDURE — 85025 COMPLETE CBC W/AUTO DIFF WBC: CPT | Performed by: INTERNAL MEDICINE

## 2018-01-16 PROCEDURE — 36415 COLL VENOUS BLD VENIPUNCTURE: CPT | Performed by: INTERNAL MEDICINE

## 2018-01-16 PROCEDURE — 74011250637 HC RX REV CODE- 250/637: Performed by: HOSPITALIST

## 2018-01-16 PROCEDURE — 84100 ASSAY OF PHOSPHORUS: CPT | Performed by: INTERNAL MEDICINE

## 2018-01-16 PROCEDURE — 74011000250 HC RX REV CODE- 250: Performed by: HOSPITALIST

## 2018-01-16 PROCEDURE — 83735 ASSAY OF MAGNESIUM: CPT | Performed by: INTERNAL MEDICINE

## 2018-01-16 RX ORDER — LANOLIN ALCOHOL/MO/W.PET/CERES
400 CREAM (GRAM) TOPICAL 2 TIMES DAILY
Status: DISCONTINUED | OUTPATIENT
Start: 2018-01-16 | End: 2018-01-16

## 2018-01-16 RX ORDER — LORAZEPAM 1 MG/1
TABLET ORAL
Qty: 10 TAB | Refills: 0 | Status: SHIPPED
Start: 2018-01-16 | End: 2018-02-02 | Stop reason: SDUPTHER

## 2018-01-16 RX ORDER — TOPIRAMATE 25 MG/1
100 TABLET ORAL 2 TIMES DAILY WITH MEALS
Status: DISCONTINUED | OUTPATIENT
Start: 2018-01-16 | End: 2018-01-16 | Stop reason: HOSPADM

## 2018-01-16 RX ORDER — SODIUM CHLORIDE 0.9 % (FLUSH) 0.9 %
20 SYRINGE (ML) INJECTION AS NEEDED
Status: DISCONTINUED | OUTPATIENT
Start: 2018-01-16 | End: 2018-01-16 | Stop reason: HOSPADM

## 2018-01-16 RX ORDER — SODIUM CHLORIDE 0.9 % (FLUSH) 0.9 %
10 SYRINGE (ML) INJECTION EVERY 24 HOURS
Status: DISCONTINUED | OUTPATIENT
Start: 2018-01-16 | End: 2018-01-16 | Stop reason: HOSPADM

## 2018-01-16 RX ORDER — LORAZEPAM 0.5 MG/1
2 TABLET ORAL
Status: DISCONTINUED | OUTPATIENT
Start: 2018-01-16 | End: 2018-01-16 | Stop reason: HOSPADM

## 2018-01-16 RX ORDER — POTASSIUM CHLORIDE 20MEQ/15ML
60 LIQUID (ML) ORAL DAILY
Status: DISCONTINUED | OUTPATIENT
Start: 2018-01-17 | End: 2018-01-16 | Stop reason: HOSPADM

## 2018-01-16 RX ORDER — POTASSIUM CHLORIDE AND SODIUM CHLORIDE 900; 300 MG/100ML; MG/100ML
INJECTION, SOLUTION INTRAVENOUS CONTINUOUS
Status: DISCONTINUED | OUTPATIENT
Start: 2018-01-16 | End: 2018-01-16 | Stop reason: HOSPADM

## 2018-01-16 RX ORDER — THERA TABS 400 MCG
1 TAB ORAL DAILY
Qty: 30 TAB | Refills: 0 | Status: SHIPPED
Start: 2018-01-17 | End: 2018-02-02

## 2018-01-16 RX ORDER — BACITRACIN 500 UNIT/G
PACKET (EA) TOPICAL
Status: DISCONTINUED
Start: 2018-01-16 | End: 2018-01-16 | Stop reason: HOSPADM

## 2018-01-16 RX ORDER — LORAZEPAM 0.5 MG/1
1 TABLET ORAL
Status: DISCONTINUED | OUTPATIENT
Start: 2018-01-16 | End: 2018-01-16 | Stop reason: HOSPADM

## 2018-01-16 RX ORDER — MAGNESIUM SULFATE HEPTAHYDRATE 40 MG/ML
2 INJECTION, SOLUTION INTRAVENOUS ONCE
Status: COMPLETED | OUTPATIENT
Start: 2018-01-16 | End: 2018-01-16

## 2018-01-16 RX ORDER — SODIUM CHLORIDE 0.9 % (FLUSH) 0.9 %
10 SYRINGE (ML) INJECTION AS NEEDED
Status: DISCONTINUED | OUTPATIENT
Start: 2018-01-16 | End: 2018-01-16 | Stop reason: HOSPADM

## 2018-01-16 RX ORDER — SODIUM,POTASSIUM PHOSPHATES 280-250MG
1 POWDER IN PACKET (EA) ORAL 4 TIMES DAILY
Status: DISCONTINUED | OUTPATIENT
Start: 2018-01-16 | End: 2018-01-16

## 2018-01-16 RX ORDER — SODIUM CHLORIDE 0.9 % (FLUSH) 0.9 %
10 SYRINGE (ML) INJECTION EVERY 8 HOURS
Status: DISCONTINUED | OUTPATIENT
Start: 2018-01-16 | End: 2018-01-16 | Stop reason: HOSPADM

## 2018-01-16 RX ORDER — FOLIC ACID 1 MG/1
1 TABLET ORAL DAILY
Qty: 30 TAB | Refills: 0 | Status: SHIPPED
Start: 2018-01-17 | End: 2018-02-02

## 2018-01-16 RX ORDER — LANOLIN ALCOHOL/MO/W.PET/CERES
400 CREAM (GRAM) TOPICAL 2 TIMES DAILY
Qty: 60 TAB | Refills: 0 | Status: SHIPPED
Start: 2018-01-16 | End: 2018-02-02

## 2018-01-16 RX ORDER — LANOLIN ALCOHOL/MO/W.PET/CERES
400 CREAM (GRAM) TOPICAL 2 TIMES DAILY
Status: DISCONTINUED | OUTPATIENT
Start: 2018-01-16 | End: 2018-01-16 | Stop reason: HOSPADM

## 2018-01-16 RX ORDER — HYDROMORPHONE HYDROCHLORIDE 2 MG/ML
1 INJECTION, SOLUTION INTRAMUSCULAR; INTRAVENOUS; SUBCUTANEOUS ONCE
Status: COMPLETED | OUTPATIENT
Start: 2018-01-16 | End: 2018-01-16

## 2018-01-16 RX ORDER — LANOLIN ALCOHOL/MO/W.PET/CERES
100 CREAM (GRAM) TOPICAL DAILY
Qty: 30 TAB | Refills: 0 | Status: SHIPPED | OUTPATIENT
Start: 2018-01-17 | End: 2018-02-02

## 2018-01-16 RX ORDER — AMLODIPINE BESYLATE 5 MG/1
10 TABLET ORAL DAILY
Status: DISCONTINUED | OUTPATIENT
Start: 2018-01-17 | End: 2018-01-16 | Stop reason: HOSPADM

## 2018-01-16 RX ADMIN — Medication 100 MG: at 09:15

## 2018-01-16 RX ADMIN — AMLODIPINE BESYLATE 10 MG: 5 TABLET ORAL at 09:15

## 2018-01-16 RX ADMIN — Medication 10 ML: at 14:42

## 2018-01-16 RX ADMIN — Medication 10 ML: at 05:22

## 2018-01-16 RX ADMIN — LEVETIRACETAM 1000 MG: 500 TABLET ORAL at 18:01

## 2018-01-16 RX ADMIN — LORAZEPAM 1 MG: 2 INJECTION, SOLUTION INTRAMUSCULAR; INTRAVENOUS at 11:05

## 2018-01-16 RX ADMIN — HYDROMORPHONE HYDROCHLORIDE 1 MG: 2 INJECTION, SOLUTION INTRAMUSCULAR; INTRAVENOUS; SUBCUTANEOUS at 05:22

## 2018-01-16 RX ADMIN — SODIUM CHLORIDE AND POTASSIUM CHLORIDE: 9; 2.98 INJECTION, SOLUTION INTRAVENOUS at 10:42

## 2018-01-16 RX ADMIN — LORAZEPAM 1 MG: 2 INJECTION, SOLUTION INTRAMUSCULAR; INTRAVENOUS at 15:02

## 2018-01-16 RX ADMIN — SODIUM CHLORIDE: 900 INJECTION, SOLUTION INTRAVENOUS at 09:19

## 2018-01-16 RX ADMIN — TOPIRAMATE 100 MG: 25 TABLET, FILM COATED ORAL at 18:01

## 2018-01-16 RX ADMIN — NEBIVOLOL HYDROCHLORIDE 20 MG: 5 TABLET ORAL at 09:15

## 2018-01-16 RX ADMIN — Medication 400 MG: at 18:02

## 2018-01-16 RX ADMIN — Medication 1 TABLET: at 17:00

## 2018-01-16 RX ADMIN — Medication 1 TABLET: at 12:00

## 2018-01-16 RX ADMIN — POTASSIUM CHLORIDE 60 MEQ: 40 SOLUTION ORAL at 09:14

## 2018-01-16 RX ADMIN — MAGNESIUM SULFATE HEPTAHYDRATE 2 G: 40 INJECTION, SOLUTION INTRAVENOUS at 09:13

## 2018-01-16 RX ADMIN — LEVETIRACETAM 1000 MG: 500 TABLET ORAL at 09:15

## 2018-01-16 RX ADMIN — THERA TABS 1 TABLET: TAB at 09:15

## 2018-01-16 RX ADMIN — POTASSIUM & SODIUM PHOSPHATES POWDER PACK 280-160-250 MG 1 PACKET: 280-160-250 PACK at 09:15

## 2018-01-16 RX ADMIN — Medication 400 MG: at 09:15

## 2018-01-16 RX ADMIN — Medication 10 ML: at 14:41

## 2018-01-16 RX ADMIN — FOLIC ACID 1 MG: 1 TABLET ORAL at 09:15

## 2018-01-16 RX ADMIN — ALPRAZOLAM 1 MG: 1 TABLET ORAL at 04:06

## 2018-01-16 RX ADMIN — FLUTICASONE PROPIONATE 2 SPRAY: 50 SPRAY, METERED NASAL at 09:00

## 2018-01-16 RX ADMIN — TOPIRAMATE 100 MG: 25 TABLET, FILM COATED ORAL at 10:43

## 2018-01-16 RX ADMIN — ALPRAZOLAM 1 MG: 1 TABLET ORAL at 18:22

## 2018-01-16 NOTE — DISCHARGE INSTRUCTIONS
Discharging provider: Phoebe Cameron MD    Primary care provider: Genaro Tirado MD    [address] [facility] COURSE:    This 60-year-old white female presents to this facility as a transfer from [facility] after she presented there unresponsive after she was found on the floor by her family, face-up and breathing.  Unable to obtain any history from the patient.  Per chart review, it is unknown the amount of time that patient was unresponsive.  EMS gave Narcan, which had no effect.  In the emergency department there, she was found to have a potassium of less than 1.8 and was responsive to painful stimuli, but not verbal.  She did have stable vital signs and reactive pupils.  ED physician at the facility spoke to spouse and the spouse reports that the patient quit drinking alcohol approximately four days ago in which she has had an extensive alcohol consumption in which she would consume approximately a 12-pack of beer per day. Cassia Vieyra noted is that the patient's spouse did see her approximately two hours prior to being found unresponsive. Acute metabolic encephalopathy (POA) - Patient with h/o alcohol abuse, found unresponsive and noted with pronounced electrolytes abnormalities.  Unclear if there was a seizure or if this is mostly dehydration  - CT head 1/14 without acute process  - Urine drug screen with benzodiazapine, THC and tricyclics positive  - Re-orient as able     Chronic alcohol dependence/abuse - EtOH level <10 on presentation  - Continue CIWA scoring with PRN ativan  - Nutritional supplementation     Hyponatremia (POA) - likely due to dehydration, sodium improving  - Continue saline  - Hold chlorthalidone  - Monitor sodium     Hypokalemia, hypophosphatemia, hypomagnesemia (POA) - monitor and replace as needed      Hx of hepatitis C with transaminitis (POA) - EtOH not helping  - Monitor liver function     Seizure disorder (chronic)  - Continue home keppra, topomax  - Seizure precautions     Report of hyperthyroidism - daughter tells me that this was diagnosed but her home doctor is avoiding treatment until the hepatitis C is dealt with (not an explanation that makes sense as methimazole can be given with liver dysfunction)  - Checking TSH, T4 and T3    Marijuana dependence/abuse - Advised to quit     Tobacco/cigarette dependence - Advised to quit     Depression - continue routine meds     Hypertension - continue home amlodipine, nevivolol. Holding chlorthalidone as above     Anxiety disorder - continue PRN xanax, though this is a terrible medication for a chronic alcoholic. Restart amitriptyline    FOLLOW-UP CARE RECOMMENDATIONS:    APPOINTMENTS:  Follow-up Information     Follow up With Details Comments Contact Info    Carole Kent MD Call As needed for primary care [address] [place] [address]  [phone]           Future Appointments  Date Time Provider Devante Amaya   2/2/2018 9:10 AM Carole Kent  [address]     It is very important that you keep follow-up appointment(s). Bring discharge papers, medication list (and/or medication bottles) to follow-up appointments for review by outpatient provider(s). FOLLOW-UP TESTS RECOMMENDED:   · Monitor basic metabolic panel, phosphorus and magnesium levels  · Monitor clinical intoxication and withdrawal assessment scores     ONGOING TREATMENT PLAN: Correct lytes, monitor for withdrawal, treat as needed    Specific symptoms to watch for: chest pain, shortness of breath, fever, chills, nausea, vomiting, diarrhea, change in mentation, falling, weakness, bleeding.     DIET:  Regular Diet    ACTIVITY:  Activity as tolerated and PT/OT Eval and Treat    GOALS OF CARE:  x  Eventual return to home/independent/assisted living     Long term SNF      Hospice     No rehospitalization     Patient condition at discharge:   Functional status    Poor    x  Deconditioned      Independent   Cognition Lucid   x  Forgetful (some sensescence)     Dementia   Catheters/lines (plus indication)    Granados     PICC      PEG         Code status  x  Full code      DNR    . . . . . . . . . . . . . . . . . . . . . . . . . . . . . . . . . . . . . . . . . . . . . . . . . . . . . . . . . . . . . . . . . . . . . . . Caryn Martinez      CHRONIC MEDICAL CONDITIONS:  Problem List as of 1/16/2018  Date Reviewed: 12/8/2017          Codes Class Noted - Resolved    Hypokalemia ICD-10-CM: E87.6  ICD-9-CM: 276.8  1/14/2018 - Present        DJD (degenerative joint disease), cervical ICD-10-CM: M50.30  ICD-9-CM: 722.4  10/12/2017 - Present        Chronic hepatitis C without hepatic coma ([facility] ICD-10-CM: B18.2  ICD-9-CM: 070.54  10/12/2017 - Present        Seizure disorder ([facility] ICD-10-CM: G40.909  ICD-9-CM: 345.90  10/12/2017 - Present        ASCVD (arteriosclerotic cardiovascular disease) ICD-10-CM: I25.10  ICD-9-CM: 429.2, 440.9  10/12/2017 - Present        Depression ICD-10-CM: F32.9  ICD-9-CM: 311  Unknown - Present        Hypertension ICD-10-CM: I10  ICD-9-CM: 401.9  Unknown - Present        Chronic pain ICD-10-CM: G89.29  ICD-9-CM: 338.29  Unknown - Present        Insomnia ICD-10-CM: G47.00  ICD-9-CM: 780.52  Unknown - Present        Anxiety ICD-10-CM: F41.9  ICD-9-CM: 300.00  Unknown - Present

## 2018-01-16 NOTE — PROGRESS NOTES
Problem: Falls - Risk of  Goal: *Absence of Falls  Document Tessie Fall Risk and appropriate interventions in the flowsheet.    Outcome: Progressing Towards Goal  Fall Risk Interventions:  Mobility Interventions: Assess mobility with egress test, Communicate number of staff needed for ambulation/transfer, OT consult for ADLs, Patient to call before getting OOB, PT Consult for mobility concerns, PT Consult for assist device competence, Strengthening exercises (ROM-active/passive)    Mentation Interventions: Adequate sleep, hydration, pain control, Door open when patient unattended, Evaluate medications/consider consulting pharmacy, Familiar objects from home, Family/sitter at bedside, Gait belt with transfers/ambulation, HELP ([address]) if available, Increase mobility, More frequent rounding, Reorient patient, Room close to nurse's station, Self-releasing belt, Toileting rounds, Update white board    Medication Interventions: Assess postural VS orthostatic hypotension, Evaluate medications/consider consulting pharmacy, Patient to call before getting OOB, Teach patient to arise slowly    Elimination Interventions: Call light in reach, Elevated toilet seat, Patient to call for help with toileting needs, Toilet paper/wipes in reach, Toileting schedule/hourly rounds    History of Falls Interventions: Consult care management for discharge planning, Door open when patient unattended, Evaluate medications/consider consulting pharmacy, Investigate reason for fall, Room close to nurse's station

## 2018-01-16 NOTE — PROGRESS NOTES
Bedside shift change report given to Socorro BUSTAMANTE (oncoming nurse) by Ester Doyle RN (offgoing nurse). Report included the following information SBAR, Kardex, ED Summary, Procedure Summary, Intake/Output, MAR, Accordion, Recent Results, Med Rec Status, Cardiac Rhythm NSR and Alarm Parameters .

## 2018-01-16 NOTE — DISCHARGE SUMMARY
Discharge Summary     Patient:  Adamaris Kenny       MRN: [id number]       YOB: 1960       Age: 65 y.o. Date of admission:  1/14/2018    Date of discharge:  1/16/2018    Primary care provider: Dr. Awa Aguilar MD    Admitting provider:  Nate Carcamo MD    Discharging provider:  Sonja Thompson 91.: [phone]. If unavailable, call [phone] and ask the  to page the triage hospitalist.    Consultations  · None    Procedures  · * No surgery found *    Discharge destination: [facility].  The patient is stable for discharge. Admission diagnosis  · severe hypokalemia/tricyclic OD  · Hypokalemia    Current Discharge Medication List      START taking these medications    Details   folic acid (FOLVITE) 1 mg tablet Take 1 Tab by mouth daily. Qty: 30 Tab, Refills: 0      magnesium oxide (MAG-OX) 400 mg tablet Take 1 Tab by mouth two (2) times a day. Qty: 60 Tab, Refills: 0      therapeutic multivitamin (THERAGRAN) tablet Take 1 Tab by mouth daily. Qty: 30 Tab, Refills: 0      thiamine (B-1) 100 mg tablet Take 1 Tab by mouth daily. Qty: 30 Tab, Refills: 0      LORazepam (ATIVAN) 1 mg tablet Assess CIWA scale and take 1 Tab by mouth every one (1) hour as needed for a CIWA score of 8-11. Take 2 Tab by mouth every one (1) hour as needed for CIWA score >11  Qty: 10 Tab, Refills: 0    Associated Diagnoses: Alcohol withdrawal syndrome with complication ([facility]         CONTINUE these medications which have NOT CHANGED    Details   amitriptyline (ELAVIL) 50 mg tablet Take 1 Tab by mouth nightly. Indications: NEUROPATHIC PAIN  Qty: 90 Tab, Refills: 3    Associated Diagnoses: Moderate episode of recurrent major depressive disorder ([address] Anxiety; Chronic pain syndrome      nebivolol (BYSTOLIC) 20 mg tablet Take 1 Tab by mouth daily.  Indications: hypertension  Qty: 90 Tab, Refills: 3    Associated Diagnoses: Essential hypertension; Hyperthyroidism      ALPRAZolam (XANAX) 1 mg tablet TAKE 1 TABLET BY MOUTH two TIMES DAILY AS NEEDED ANXIETY  Qty: 60 Tab, Refills: 0    Associated Diagnoses: Moderate episode of recurrent major depressive disorder ([facility] Anxiety      promethazine (PHENERGAN) 25 mg tablet 1 po TID for nausea, migraine  Qty: 30 Tab, Refills: 2    Associated Diagnoses: Chronic pain syndrome      ibuprofen (MOTRIN) 600 mg tablet Take 1 Tab by mouth three (3) times daily as needed for Pain. Indications: replaces methadone  Qty: 90 Tab, Refills: 11    Associated Diagnoses: DJD (degenerative joint disease), cervical      topiramate (TOPAMAX) 100 mg tablet Take 1 Tab by mouth two (2) times a day. Indications: nerves and seizures  Qty: 60 Tab, Refills: 11    Associated Diagnoses: Chronic pain syndrome; Seizure disorder (HCC)      amLODIPine (NORVASC) 10 mg tablet Take 1 Tab by mouth daily. Qty: 90 Tab, Refills: 3    Associated Diagnoses: Essential hypertension with goal blood pressure less than 130/80      fluticasone (FLONASE) 50 mcg/actuation nasal spray 2 Sprays by Both Nostrils route daily. Qty: 1 Bottle, Refills: 2    Associated Diagnoses: Acute maxillary sinusitis, recurrence not specified      levETIRAcetam 1,000 mg tablet Take 1 Tab by mouth two (2) times a day. Indications: TONIC-CLONIC EPILEPSY TREATMENT ADJUNCT  Qty: 60 Tab, Refills: 5    Associated Diagnoses: Convulsions, unspecified convulsion type ([facility]         STOP taking these medications       chlorthalidone (HYGROTEN) 25 mg tablet Comments:   Reason for Stopping:                Follow-up Information     Follow up With Details Comments Contact Falguni Nuno MD Call As needed for primary care [address] [place] [address]  [phone]            Future Appointments  Date Time Provider Devante Conde   2/2/2018 9:10 AM Anh Escoto MD [address] [place] [address]     Final discharge diagnoses and brief hospital course  Please also refer to the admission H&P for details on the presenting problem. This 67-year-old white female presents to this facility as a transfer from [facility] after she presented there unresponsive after she was found on the floor by her family, face-up and breathing.  Unable to obtain any history from the patient.  Per chart review, it is unknown the amount of time that patient was unresponsive.  EMS gave Narcan, which had no effect.  In the emergency department there, she was found to have a potassium of less than 1.8 and was responsive to painful stimuli, but not verbal.  She did have stable vital signs and reactive pupils.  ED physician at the facility spoke to spouse and the spouse reports that the patient quit drinking alcohol approximately four days ago in which she has had an extensive alcohol consumption in which she would consume approximately a 12-pack of beer per day. Jorge Giuliano noted is that the patient's spouse did see her approximately two hours prior to being found unresponsive. Acute metabolic encephalopathy (POA) - Patient with h/o alcohol abuse, found unresponsive and noted with pronounced electrolytes abnormalities.  Unclear if there was a seizure or if this is mostly dehydration  - CT head 1/14 without acute process  - Urine drug screen with benzodiazapine, THC and tricyclics positive  - Re-orient as able     Chronic alcohol dependence/abuse - EtOH level <10 on presentation  - Continue CIWA scoring with PRN ativan  - Nutritional supplementation     Hyponatremia (POA) - likely due to dehydration, sodium improving  - Continue saline  - Hold chlorthalidone  - Monitor sodium     Hypokalemia, hypophosphatemia, hypomagnesemia (POA) - monitor and replace as needed      Hx of hepatitis C with transaminitis (POA) - EtOH not helping  - Monitor liver function     Seizure disorder (chronic)  - Continue home keppra, topomax  - Seizure precautions     Report of hyperthyroidism - daughter tells me that this was diagnosed but her home doctor is avoiding treatment until the hepatitis C is dealt with (not an explanation that makes sense as methimazole can be given with liver dysfunction)  - Checking TSH, T4 and T3    Marijuana dependence/abuse - Advised to quit     Tobacco/cigarette dependence - Advised to quit     Depression - continue routine meds     Hypertension - continue home amlodipine, nevivolol. Holding chlorthalidone as above     Anxiety disorder - continue PRN xanax, though this is a terrible medication for a chronic alcoholic. Restart amitriptyline    FOLLOW-UP CARE RECOMMENDATIONS:    It is very important that you keep follow-up appointment(s). Bring discharge papers, medication list (and/or medication bottles) to follow-up appointments for review by outpatient provider(s). FOLLOW-UP TESTS RECOMMENDED:   · Monitor basic metabolic panel, phosphorus and magnesium levels  · Monitor clinical intoxication and withdrawal assessment scores     ONGOING TREATMENT PLAN: Correct lytes, monitor for withdrawal, treat as needed    Specific symptoms to watch for: chest pain, shortness of breath, fever, chills, nausea, vomiting, diarrhea, change in mentation, falling, weakness, bleeding. DIET:  Regular Diet    ACTIVITY:  Activity as tolerated and PT/OT Eval and Treat    Physical examination at discharge  Visit Vitals    /70 (BP 1 Location: Left arm, BP Patient Position: At rest)    Pulse 70    Temp 98.1 °F (36.7 °C)    Resp 16    Ht 5' 5.98\" (1.676 m)    Wt 107.8 kg (237 lb 9.6 oz)    SpO2 94%    BMI 38.37 kg/m2     Constitutional:  No acute distress, looks fatigued   ENT:  Oral mucous moist, oropharynx benign. Neck supple,    Resp:  CTA bilaterally. No wheezing/rhonchi/rales. No accessory muscle use   CV:  Regular rhythm, normal rate, no murmurs, gallops, rubs    GI:  Soft, non distended, non tender.  normoactive bowel sounds, no hepatosplenomegaly     Musculoskeletal:  No edema, warm, 2+ pulses throughout    Neurologic:  Moves all extremities. AAOx3, CN II-XII reviewed     Pertinent imaging studies:    CT head 1/14/18  FINDINGS: The ventricles, cisterns, and cortical sulci are normal. The  gray-white matter demarcation is normal. No abnormal mass is identified. There  is no evidence of an acute infarction, hemorrhage, or mass-effect. There is no  evidence of midline shift or hydrocephalus. Posterior fossa structures are  unremarkable. No extra-axial collections are seen.     Mastoid air cells and the visualized paranasal sinuses are well pneumatized and  clear. There is no evidence of depressed skull fractures of soft tissue  swelling.     IMPRESSION  IMPRESSION: Normal noncontrast CT scan of the head. CXR 1/14/18  FINDINGS: Cardiac monitoring wires overlie the thorax. The cardiomediastinal  contours are within normal limits. The lungs and pleural spaces are clear. There  is no pneumothorax. The bones and upper abdomen are normal for age.     IMPRESSION  IMPRESSION: No acute process. Xray abdomen 1/14/18  FINDINGS:     AP radiograph of the abdomen demonstrates a nonobstructive bowel gas pattern. Nasogastric tube tip projects over the gastric fundus. Side hole overlies the  gastroesophageal junction. There is a right femoral catheter tip over the  sacroiliac joint. The osseous structures are normal.     IMPRESSION  IMPRESSION:  Nasogastric tube tip projects over the proximal gastric fundus.        Recent Labs      01/16/18   0353  01/14/18   2205  01/14/18   1515   WBC  10.0  10.6  9.3   HGB  10.8*  11.7  12.7   HCT  30.9*  32.5*  35.4   PLT  231  202  236     Recent Labs      01/16/18   0353  01/15/18   0252  01/14/18   2150  01/14/18   1515   NA  131*  130*  129*  125*   K  2.8*  3.5  2.2*  <1.8*   CL  98  93*  84*  85*   CO2  24  30  35*  30   BUN  6  19  23*  27*   CREA  0.61  0.89  1.11*  1.38*   GLU  98  123*  150*  192*   CA 8. 1*  8.4*  8.5  9.0   MG  1.3*  2.0   --   2.0   PHOS  2.0*   --    --    --      Recent Labs      01/16/18   0353  01/14/18   1515   SGOT  38*  125*   AP  93  112   TP  6.4  7.4   ALB  2.9*  3.7   GLOB  3.5  3.7     No results for input(s): INR, PTP, APTT in the last 72 hours. No lab exists for component: INREXT   No results for input(s): FE, TIBC, PSAT, FERR in the last 72 hours. No results for input(s): PH, PCO2, PO2 in the last 72 hours. Recent Labs      01/14/18   1515   CPK  22*   CKMB  <1.0     No components found for: GLPOC    Chronic Diagnoses:    Problem List as of 1/16/2018  Date Reviewed: 12/8/2017          Codes Class Noted - Resolved    Hypokalemia ICD-10-CM: E87.6  ICD-9-CM: 276.8  1/14/2018 - Present        DJD (degenerative joint disease), cervical ICD-10-CM: M50.30  ICD-9-CM: 722.4  10/12/2017 - Present        Chronic hepatitis C without hepatic coma ([facility] ICD-10-CM: B18.2  ICD-9-CM: 070.54  10/12/2017 - Present        Seizure disorder ([facility] ICD-10-CM: G40.909  ICD-9-CM: 345.90  10/12/2017 - Present        ASCVD (arteriosclerotic cardiovascular disease) ICD-10-CM: I25.10  ICD-9-CM: 429.2, 440.9  10/12/2017 - Present        Depression ICD-10-CM: F32.9  ICD-9-CM: 311  Unknown - Present        Hypertension ICD-10-CM: I10  ICD-9-CM: 401.9  Unknown - Present        Chronic pain ICD-10-CM: G89.29  ICD-9-CM: 338.29  Unknown - Present        Insomnia ICD-10-CM: G47.00  ICD-9-CM: 780.52  Unknown - Present        Anxiety ICD-10-CM: F41.9  ICD-9-CM: 300.00  Unknown - Present              Time spent on discharge related activities today greater than 30 minutes.       Signed:  Armando Gutierrez MD                 Hospitalist, Internal Medicine      Cc: Savi Tucker MD

## 2018-01-16 NOTE — INTERDISCIPLINARY ROUNDS
IDR/SLIDR Summary          Patient: Rosa Pringle MRN: [id number]    Age: 65 y.o. YOB: 1960 Room/Bed: [facility]   Admit Diagnosis: severe hypokalemia/tricyclic OD  Hypokalemia  Principal Diagnosis: <principal problem not specified>   Goals: transfer to [facility], fluid replacement  Readmission: NO  Quality Measure: CIWA  VTE Prophylaxis: Mechanical  Influenza Vaccine screening completed? YES  Pneumococcal Vaccine screening completed? YES  Mobility needs: Yes   Nutrition plan:Yes  Consults:P.T, O.T. and Case Management    Financial concerns:Yes  Escalated to CM? YES  RRAT Score: 5   Interventions:   Testing due for pt today?  NO  LOS: 2 days Expected length of stay 2 days  Discharge plan: tbd   PCP: Stacey Stone MD  Transportation needs: Yes    Days before discharge:one day until discharge   Discharge disposition: [facility]    Signed:     Chava Patino  01/16/18  0730 am

## 2018-01-16 NOTE — PROGRESS NOTES
Physical Therapy note  1/16/18    Order acknowledged and chart reviewed. Spoke with CM who reports pt pending transfer to [address] today (insurance is not accepted at this facility). Will defer eval at this time and plan to follow up tomorrow if transfer does not occur.      Thank you,  Sanjuana Walters, PT, DPT

## 2018-01-16 NOTE — ROUTINE PROCESS
TRANSFER - OUT REPORT:    Verbal report given to Stacy(name) on Sil Andrade  being transferred to [facility] PCU unit(unit) for routine progression of care       Report consisted of patients Situation, Background, Assessment and   Recommendations(SBAR). Information from the following report(s) SBAR, ED Summary, Intake/Output, MAR, Recent Results and Cardiac Rhythm NSR was reviewed with the receiving nurse. Lines:   Peripheral IV 01/16/18 Right Hand (Active)   Site Assessment Clean, dry, & intact 1/16/2018  2:22 PM   Phlebitis Assessment 0 1/16/2018  2:22 PM   Infiltration Assessment 0 1/16/2018  2:22 PM   Dressing Status New;Clean, dry, & intact 1/16/2018  2:22 PM   Dressing Type Tape;Transparent 1/16/2018  2:22 PM   Hub Color/Line Status Yellow;Capped;Flushed 1/16/2018  2:22 PM   Action Taken Open ports on tubing capped 1/16/2018  2:22 PM   Alcohol Cap Used Yes 1/16/2018  2:22 PM        Opportunity for questions and clarification was provided.       Patient and belongings transported with:  EMS

## 2018-01-16 NOTE — PROGRESS NOTES
Care Management     Received SBAR from [facility], call made to  [facility] transfer center ([phone]) and spoke with Susi Garcia. Attempting to follow-up on status of transfer of patient to [facility]. Per [facility] transfer notes Dr. Amy Walton spoke with Dr. Idania Alcaraz, plan was for patient to be discharged tomorrow 1/16. They didn't accept transfer due to discharge planned for the next morning. Care Management will continue to follow for discharge planning.     Mery Martin, CRM

## 2018-01-16 NOTE — ROUTINE PROCESS
Bedside shift change report given to Tip Ware (oncoming nurse) by Sarah Quintana (offgoing nurse). Report included the following information SBAR, ED Summary, MAR, Recent Results and Cardiac Rhythm NSR.

## 2018-01-16 NOTE — PROGRESS NOTES
Cm called the [facility] transfer center [phone]) to request a transfer for this pt to an [facility] facility (insurance driven). A hospitalist from [facility] will contact attending. Attending notified.  Carmine Coffman

## 2018-01-16 NOTE — PROGRESS NOTES
Hospitalist Progress Note  Michael Macdonald MD  Answering service: [phone] from in house phone      Date of Service:  2018  NAME:  Owen Gonzales  :  1960  MRN:  [id number]      Admission Summary: This 60-year-old white female presents to this facility as a transfer from [facility] after she presented there unresponsive after she was found on the floor by her family, face-up and breathing. Unable to obtain any history from the patient. Per chart review, it is unknown the amount of time that patient was unresponsive. EMS gave Narcan, which had no effect. In the emergency department there, she was found to have a potassium of less than 1.8 and was responsive to painful stimuli, but not verbal.  She did have stable vital signs and reactive pupils. ED physician at the facility spoke to spouse and the spouse reports that the patient quit drinking alcohol approximately four days ago in which she has had an extensive alcohol consumption in which she would consume approximately a 12-pack of beer per day. Also noted is that the patient's spouse did see her approximately two hours prior to being found unresponsive. Interval history / Subjective:   Ms. Kiana Hernandez is feeling terrible. Still fatigued, confused, weak. No headache, dizziness. Assessment & Plan:     Acute metabolic encephalopathy (POA) - Patient with h/o alcohol abuse, found unresponsive and noted with pronounced electrolytes abnormalities.  Unclear if there was a seizure or if this is mostly dehydration  - CT head  without acute process  - Urine drug screen with benzodiazapine, THC and tricyclics positive  - Re-orient as able    Chronic alcohol dependence/abuse - EtOH level <10 on presentation  - Continue CIWA scoring with PRN ativan  - Nutritional supplementation    Hyponatremia (POA) - likely due to dehydration, sodium improving  - Continue saline  - Hold chlorthalidone  - Monitor sodium    Hypokalemia, hypophosphatemia, hypomagnesemia (POA) - monitor and replace as needed     Hx of hepatitis C with transaminitis (POA) - EtOH not helping  - Monitor liver function    Seizure disorder (chronic)  - Continue home keppra, topomax  - Seizure precautions    Report of hyperthyroidism - daughter tells me that this was diagnosed but her home doctor is avoiding treatment until the hepatitis C is dealt with (not an explanation that makes sense as methimazole can be given with liver dysfunction)  - Checking TSH, T4 and T3    Marijuana dependence/abuse - Advised to quit    Tobacco/cigarette dependence - Advised to quit    Depression - continue routine meds    Hypertension - continue home amlodipine, nevivolol. Holding chlorthalidone as above    Anxiety disorder - continue PRN xanax, though this is a terrible medication for a chronic alcoholic. Holding amitriptyline (though this is more for chronic pain)    Code status: full code  DVT prophylaxis: heparin  Care Plan discussed with: ,patient  Disposition:Patient was supposed to be transferred to the [facility] due to insurance issues     Hospital Problems  Date Reviewed: 12/8/2017          Codes Class Noted POA    Hypokalemia ICD-10-CM: E87.6  ICD-9-CM: 276.8  1/14/2018 Unknown                Review of Systems:   A comprehensive review of systems was negative except for that written in the HPI. Vital Signs:    Last 24hrs VS reviewed since prior progress note.  Most recent are:  Visit Vitals    /70 (BP 1 Location: Left arm, BP Patient Position: At rest)    Pulse 70    Temp 98.1 °F (36.7 °C)    Resp 16    Ht 5' 5.98\" (1.676 m)    Wt 107.8 kg (237 lb 9.6 oz)    SpO2 99%    BMI 38.37 kg/m2       No intake or output data in the 24 hours ending 01/16/18 9822     Physical Examination:             Constitutional:  No acute distress, looks fatigued   ENT:  Oral mucous moist, oropharynx benign. Neck supple,    Resp:  CTA bilaterally. No wheezing/rhonchi/rales. No accessory muscle use   CV:  Regular rhythm, normal rate, no murmurs, gallops, rubs    GI:  Soft, non distended, non tender. normoactive bowel sounds, no hepatosplenomegaly     Musculoskeletal:  No edema, warm, 2+ pulses throughout    Neurologic:  Moves all extremities. AAOx3, CN II-XII reviewed            Data Review:     Telemetry x   ECG x   Xray x   CT scan x   MRI    Echocardiogram    Ultrasound              I have reviewed the flow sheet and recent notes  New labs and data personally reviewed. Labs:     Recent Labs      01/16/18   0353  01/14/18   2205   WBC  10.0  10.6   HGB  10.8*  11.7   HCT  30.9*  32.5*   PLT  231  202     Recent Labs      01/16/18   0353  01/15/18   0252  01/14/18   2150  01/14/18   1515   NA  131*  130*  129*  125*   K  2.8*  3.5  2.2*  <1.8*   CL  98  93*  84*  85*   CO2  24  30  35*  30   BUN  6  19  23*  27*   CREA  0.61  0.89  1.11*  1.38*   GLU  98  123*  150*  192*   CA  8.1*  8.4*  8.5  9.0   MG  1.3*  2.0   --   2.0   PHOS  2.0*   --    --    --      Recent Labs      01/16/18   0353  01/14/18   1515   SGOT  38*  125*   ALT  72  146*   AP  93  112   TBILI  0.5  0.3   TP  6.4  7.4   ALB  2.9*  3.7   GLOB  3.5  3.7     No results for input(s): INR, PTP, APTT in the last 72 hours. No lab exists for component: INREXT, INREXT   No results for input(s): FE, TIBC, PSAT, FERR in the last 72 hours. No results found for: FOL, RBCF   No results for input(s): PH, PCO2, PO2 in the last 72 hours.   Recent Labs      01/14/18   1515   CPK  22*   CKNDX  Cannot be calculated   TROIQ  0.08*     Lab Results   Component Value Date/Time    Cholesterol, total 213 10/12/2017 10:27 AM    HDL Cholesterol 80 10/12/2017 10:27 AM    LDL, calculated 100 10/12/2017 10:27 AM    Triglyceride 163 10/12/2017 10:27 AM     No results found for: GLUCPOC  Lab Results   Component Value Date/Time    Color DARK YELLOW 01/14/2018 03:15 PM    Appearance HAZY 01/14/2018 03:15 PM    Specific gravity 1.015 01/14/2018 03:15 PM    pH (UA) 5.5 01/14/2018 03:15 PM    Protein NEGATIVE  01/14/2018 03:15 PM    Glucose NEGATIVE  01/14/2018 03:15 PM    Ketone NEGATIVE  01/14/2018 03:15 PM    Urobilinogen 0.2 01/14/2018 03:15 PM    Nitrites NEGATIVE  01/14/2018 03:15 PM    Leukocyte Esterase NEGATIVE  01/14/2018 03:15 PM    Epithelial cells FEW 01/14/2018 03:15 PM    Bacteria NEGATIVE  01/14/2018 03:15 PM    WBC 0-4 01/14/2018 03:15 PM    RBC 0-5 01/14/2018 03:15 PM         Medications Reviewed:     Current Facility-Administered Medications   Medication Dose Route Frequency    0.9% sodium chloride with KCl 40 mEq/L infusion   IntraVENous CONTINUOUS    [START ON 1/17/2018] amLODIPine (NORVASC) tablet 10 mg  10 mg Oral DAILY    magnesium oxide (MAG-OX) tablet 400 mg  400 mg Oral BID    [START ON 1/17/2018] potassium chloride (KAON 10%) 20 mEq/15 mL oral liquid 60 mEq  60 mEq Oral DAILY    phosphorus (K PHOS NEUTRAL) 250 mg tablet 1 Tab  1 Tab Oral TID WITH MEALS    topiramate (TOPAMAX) tablet 100 mg  100 mg Oral BID WITH MEALS    sodium chloride (NS) flush 20 mL  20 mL InterCATHeter PRN    sodium chloride (NS) flush 10 mL  10 mL InterCATHeter Q24H    sodium chloride (NS) flush 10 mL  10 mL InterCATHeter PRN    sodium chloride (NS) flush 10 mL  10 mL InterCATHeter Q8H    ibuprofen (ADVIL;MOTRIN) 100 mg/5 mL oral suspension 400 mg  400 mg Per NG tube Q4H PRN    ALPRAZolam (XANAX) tablet 1 mg  1 mg Oral BID PRN    levETIRAcetam (KEPPRA) tablet 1,000 mg  1,000 mg Oral BID    thiamine (B-1) tablet 100 mg  100 mg Oral DAILY    folic acid (FOLVITE) tablet 1 mg  1 mg Oral DAILY    therapeutic multivitamin (THERAGRAN) tablet 1 Tab  1 Tab Oral DAILY    LORazepam (ATIVAN) injection 1 mg  1 mg IntraVENous Q6H PRN    sodium chloride (NS) flush 5-10 mL  5-10 mL IntraVENous Q8H    sodium chloride (NS) flush 5-10 mL  5-10 mL IntraVENous PRN    ondansetron (ZOFRAN) injection 4 mg  4 mg IntraVENous Q4H PRN    influenza vaccine 2017-18 (3 yrs+)(PF) (FLUZONE QUAD/FLUARIX QUAD) injection 0.5 mL  0.5 mL IntraMUSCular PRIOR TO DISCHARGE    fluticasone (FLONASE) 50 mcg/actuation nasal spray 2 Spray  2 Spray Both Nostrils DAILY    nebivolol (BYSTOLIC) tablet 20 mg  20 mg Oral DAILY     ______________________________________________________________________  EXPECTED LENGTH OF STAY: 3d 7h  ACTUAL LENGTH OF STAY:          2                 Toño Ahumada MD

## 2018-01-17 NOTE — ROUTINE PROCESS
Bedside shift change report given to Tip Ware (oncoming nurse) by Redd Reed (offgoing nurse). Report included the following information SBAR, ED Summary, Intake/Output, Recent Results and Cardiac Rhythm NSR.

## 2018-01-22 NOTE — PROGRESS NOTES
cdmp query:Hyponatremia in the setting of serum sodium level of 125/129/130 requiring serial lab monitoring and IVFs

## 2018-02-02 ENCOUNTER — OFFICE VISIT (OUTPATIENT)
Dept: FAMILY MEDICINE CLINIC | Age: 58
End: 2018-02-02

## 2018-02-02 VITALS
HEIGHT: 66 IN | WEIGHT: 118.4 LBS | HEART RATE: 64 BPM | SYSTOLIC BLOOD PRESSURE: 144 MMHG | DIASTOLIC BLOOD PRESSURE: 82 MMHG | BODY MASS INDEX: 19.03 KG/M2 | OXYGEN SATURATION: 99 % | RESPIRATION RATE: 20 BRPM | TEMPERATURE: 96.9 F

## 2018-02-02 DIAGNOSIS — G40.909 SEIZURE DISORDER (HCC): ICD-10-CM

## 2018-02-02 DIAGNOSIS — E87.1 HYPONATREMIA: ICD-10-CM

## 2018-02-02 DIAGNOSIS — F41.9 ANXIETY: ICD-10-CM

## 2018-02-02 DIAGNOSIS — F33.1 MODERATE EPISODE OF RECURRENT MAJOR DEPRESSIVE DISORDER (HCC): Primary | ICD-10-CM

## 2018-02-02 DIAGNOSIS — M15.9 OSTEOARTHRITIS OF MULTIPLE JOINTS, UNSPECIFIED OSTEOARTHRITIS TYPE: ICD-10-CM

## 2018-02-02 DIAGNOSIS — B18.2 HEP C W/O COMA, CHRONIC (HCC): ICD-10-CM

## 2018-02-02 DIAGNOSIS — I10 ESSENTIAL HYPERTENSION: ICD-10-CM

## 2018-02-02 RX ORDER — MELOXICAM 15 MG/1
15 TABLET ORAL
Qty: 30 TAB | Refills: 1 | Status: SHIPPED | OUTPATIENT
Start: 2018-02-02 | End: 2018-01-01 | Stop reason: SDUPTHER

## 2018-02-02 RX ORDER — CHLORTHALIDONE 25 MG/1
TABLET ORAL DAILY
COMMUNITY
End: 2018-02-02 | Stop reason: SINTOL

## 2018-02-02 RX ORDER — OMEPRAZOLE 20 MG/1
20 CAPSULE, DELAYED RELEASE ORAL DAILY
COMMUNITY
End: 2018-01-01 | Stop reason: SDUPTHER

## 2018-02-02 RX ORDER — LORAZEPAM 1 MG/1
TABLET ORAL
Qty: 60 TAB | Refills: 0 | Status: SHIPPED | OUTPATIENT
Start: 2018-02-02 | End: 2018-01-01

## 2018-02-02 NOTE — MR AVS SNAPSHOT
Garcia Flores 
 
 
 [address] [facility] [phone] Patient: Toñito Reece MRN: [id number] :1960 Visit Information Date & Time Provider Department Dept. Phone Encounter #  
 2018  9:10 AM Emilio Chowdhury MD [address] [place] [address] [id number] Follow-up Instructions Return in about 4 weeks (around 3/2/2018). Upcoming Health Maintenance Date Due DTaP/Tdap/Td series (1 - Tdap) 1982 PAP AKA CERVICAL CYTOLOGY 1982 FOBT Q 1 YEAR AGE 50-75 2011 BREAST CANCER SCRN MAMMOGRAM 2019 Allergies as of 2018  Review Complete On: 2018 By: Joy Buck LPN Severity Noted Reaction Type Reactions Labetalol Medium 2016    Diarrhea Losartan Medium 2016    Diarrhea GI Intolerance Nsaids (Non-steroidal Anti-inflammatory Drug)  2018    Other (comments) Must Avoid. Current Immunizations  Reviewed on 8/15/2017 No immunizations on file. Not reviewed this visit You Were Diagnosed With   
  
 Codes Comments Moderate episode of recurrent major depressive disorder ([facility]    -  Primary ICD-10-CM: F33.1 ICD-9-CM: 296.32 Hep C w/o coma, chronic (HCC)     ICD-10-CM: B18.2 ICD-9-CM: 070.54 Essential hypertension     ICD-10-CM: I10 
ICD-9-CM: 401.9 Seizure disorder ([facility]     ICD-10-CM: G40.909 ICD-9-CM: 345.90 Hyponatremia     ICD-10-CM: E87.1 ICD-9-CM: 276.1 Anxiety     ICD-10-CM: F41.9 ICD-9-CM: 300.00 Osteoarthritis of multiple joints, unspecified osteoarthritis type     ICD-10-CM: M15.9 ICD-9-CM: 715.89 Vitals BP Pulse Temp Resp Height(growth percentile) 144/82 (BP 1 Location: Left arm, BP Patient Position: Sitting) 64 96.9 °F (36.1 °C) (Temporal) 20 5' 5.98\" (1.676 m) Weight(growth percentile) SpO2 BMI OB Status Smoking Status 118 lb 6.4 oz (53.7 kg) 99% 19.12 kg/m2 Menopause Current Every Day Smoker Vitals History BMI and BSA Data Body Mass Index Body Surface Area  
 19.12 kg/m 2 1.58 m 2 Preferred Pharmacy Pharmacy Name Phone [facility], [address] AT [facility]  [address] & [address] [phone] Your Updated Medication List  
  
   
This list is accurate as of: 2/2/18 11:11 AM.  Always use your most recent med list.  
  
  
  
  
 amitriptyline 50 mg tablet Commonly known as:  ELAVIL Take 1 Tab by mouth nightly. Indications: NEUROPATHIC PAIN  
  
 amLODIPine 10 mg tablet Commonly known as:  [place]oles Take 1 Tab by mouth daily. fluticasone 50 mcg/actuation nasal spray Commonly known as:  Libra Paget 2 Sprays by Both Nostrils route daily. LORazepam 1 mg tablet Commonly known as:  ATIVAN  
BID as needed for anxiety  Indications: nerves  
  
 meloxicam 15 mg tablet Commonly known as:  MOBIC Take 1 Tab by mouth daily as needed (arthritis). nebivolol 20 mg tablet Commonly known as:  BYSTOLIC Take 1 Tab by mouth daily. Indications: hypertension  
  
 omeprazole 20 mg capsule Commonly known as:  PRILOSEC Take 20 mg by mouth daily. topiramate 100 mg tablet Commonly known as:  TOPAMAX Take 1 Tab by mouth two (2) times a day. Indications: nerves and seizures Prescriptions Printed Refills LORazepam (ATIVAN) 1 mg tablet 0 Sig: BID as needed for anxiety  Indications: nerves Class: Print Prescriptions Sent to Pharmacy Refills  
 meloxicam (MOBIC) 15 mg tablet 1 Sig: Take 1 Tab by mouth daily as needed (arthritis). Class: Normal  
 Pharmacy: [email or web address], [address] [facility] [address] Ph #: [phone] Route: Oral  
  
We Performed the Following REFERRAL TO PSYCHIATRY [REF91 Custom] Comments: Recurrent severe depression complicated by alcoholism, MJ use, and chronic pain, with recent episode of withdrawals. Follow-up Instructions Return in about 4 weeks (around 3/2/2018). To-Do List   
 02/02/2018 Lab:  METABOLIC PANEL, BASIC Referral Information Referral ID Referred By Referred To  
  
 [id number] Sari Apley, MD   
   [address] [place] [address] Phone: [phone] Fax: [phone] Visits Status Start Date End Date 1 New Request 2/2/18 2/2/19 If your referral has a status of pending review or denied, additional information will be sent to support the outcome of this decision. Patient Instructions Stop the chlorthalidone for now. I am concerned about your low potassium and sodium levels and chlorhalidone may make it worse. Introducing [facility]! Gene Mcgregor introduces Onfan patient portal. Now you can access parts of your medical record, email your doctor's office, and request medication refills online. 1. In your internet browser, go to [email or web address] 2. Click on the First Time User? Click Here link in the Sign In box. You will see the New Member Sign Up page. 3. Enter your Onfan Access Code exactly as it appears below. You will not need to use this code after youve completed the sign-up process. If you do not sign up before the expiration date, you must request a new code. · Onfan Access Code: [email or web address] Expires: 4/14/2018  9:08 PM 
 
4. Enter the last four digits of your Social Security Number (xxxx) and Date of Birth (mm/dd/yyyy) as indicated and click Submit. You will be taken to the next sign-up page. 5. Create a ReachTaxt ID. This will be your Onfan login ID and cannot be changed, so think of one that is secure and easy to remember. 6. Create a ReachTaxt password. You can change your password at any time. 7. Enter your Password Reset Question and Answer. This can be used at a later time if you forget your password. 8. Enter your e-mail address. You will receive e-mail notification when new information is available in [address] 9. Click Sign Up. You can now view and download portions of your medical record. 10. Click the Download Summary menu link to download a portable copy of your medical information. If you have questions, please visit the Frequently Asked Questions section of the Budget[employer] website. Remember, Budget[employer] is NOT to be used for urgent needs. For medical emergencies, dial 911. Now available from your iPhone and Android! Please provide this summary of care documentation to your next provider. Your primary care clinician is listed as Dewitt Severance K. Bavuso. If you have any questions after today's visit, please call [phone].

## 2018-02-02 NOTE — PATIENT INSTRUCTIONS
Stop the chlorthalidone for now. I am concerned about your low potassium and sodium levels and chlorhalidone may make it worse.

## 2018-02-02 NOTE — PROGRESS NOTES
2/2/2018    Chief Complaint   Patient presents with    Hepatitis C       HPI: Mann Ocampo is a 65 y.o. female.  WF who lives with her ex- and son. PT here for f/u hospitalization. Hx at time of hospitalization last mo: This 25-year-old white female presented to [facility] [place] as transfer from [facility] after she presented there unresponsive after she was found on the floor by her family, face-up and breathing.  Unable to obtain any history from the patient.  Per chart review, it is unknown the amount of time that patient was unresponsive.  EMS gave Narcan, which had no effect.  In the emergency department there, she was found to have a potassium of less than 1.8 and was responsive to painful stimuli, but not verbal.  She did have stable vital signs and reactive pupils.  ED physician at the facility spoke to spouse and the spouse reports that the patient quit drinking alcohol approximately four days prior to admission. Pt initially reported to me that she hadn't had any alcohol since before Christmas though. Pt has hx of extensive alcohol consumption in which she would consume approximately a 12-pack of beer per day. Gurpreet Grant noted is that the patient's spouse did see her approximately two hours prior to being found unresponsive. A/P:  Acute metabolic encephalopathy (POA) - Patient with h/o alcohol abuse, found unresponsive and noted with pronounced electrolytes abnormalities.  Unclear if there was a seizure or if this is mostly dehydration  - CT head 1/14 without acute process - Urine drug screen with benzodiazapine, THC and tricyclics positive      Chronic alcohol dependence/abuse - EtOH level <10 on presentation  - CIWA scoring was benign.  - Nutritional supplementation was used.      Hyponatremia (POA) - likely due to dehydration, sodium improving  - Tx with saline infusion - Hold chlorthalidone      Hypokalemia, hypophosphatemia, hypomagnesemia (POA) - monitor and replace as needed. CTL held.      Hx of hepatitis C with transaminitis (POA)   - EtOH not helping - liver function was good. Pt reports an US at [facility]. Did get an EGD with Dr. Malachi Duane from [facility] near [facility]. Showed gastritis.      Seizure disorder (chronic)  - Continued home keppra, topomax. Per our chart review at [facility], pt has been off keppra since early 2017 due to lack of seizures. Seema well though in [address] [facility]  Report of hyperthyroidism - daughter tells me that this was diagnosed but her home doctor is avoiding treatment until the hepatitis C is dealt with (not an explanation that makes sense as methimazole can be given with liver dysfunction). Per our chart review in [facility], T4 and T3 were in normal ranges, and TSH was mildly suppressed. There are many possible explanations for this, including alcoholism, graves disease, hashimotos', and toxic goiter. Pt with complicated history of chronic pain, depression, anxiety. History of ETOH abuse, with possible ETOH withdrawal seizures. She is still drinking some, but after recent hospitalization has not been drinking at all. Mood and emotional lability are a little worse lately though. Chronic pain. Diagnosis DJD C-spine. Currently off methadone. Using motrin 600mg TID prn pain. Neuromodulator: keppra and topamax  Antidepressant: we boosted elavil to 50mg last visit. During hosp, Had TCA on screen, concerned for overdose of elavil, but on review, Hosp did con't that at d/c and pt has been taking that at home. Denies Constipation, Sedation. No concerns for overuse or diversion seen, but has been using MJ off and on lately. Med use agreement reviewed, on chart. Hypertension. Blood pressures have been improving and pulse better with r/s bystolic 37ZN every day. Affording that ok. Current regimen: beta-blocker, calcium channel blocker. Pt remains on thiazide diuretic (chlorthalidone), but it seems hosp may have tried to stop that.  Patient denies chest pain, palpitations, peripheral edema. Lab review:   Lab Results   Component Value Date/Time    Sodium 131 01/16/2018 03:53 AM    Potassium 2.8 01/16/2018 03:53 AM    Chloride 98 01/16/2018 03:53 AM    CO2 24 01/16/2018 03:53 AM    Anion gap 9 01/16/2018 03:53 AM    Glucose 98 01/16/2018 03:53 AM    BUN 6 01/16/2018 03:53 AM    Creatinine 0.61 01/16/2018 03:53 AM    BUN/Creatinine ratio 10 01/16/2018 03:53 AM    GFR est AA >60 01/16/2018 03:53 AM    GFR est non-AA >60 01/16/2018 03:53 AM    Calcium 8.1 01/16/2018 03:53 AM     Lab Results   Component Value Date/Time    Cholesterol, total 213 10/12/2017 10:27 AM    HDL Cholesterol 80 10/12/2017 10:27 AM    LDL, calculated 100 10/12/2017 10:27 AM    VLDL, calculated 33 10/12/2017 10:27 AM    Triglyceride 163 10/12/2017 10:27 AM     Seizure d/o. On topamax alone now. Taking regularly. No known sz since last visit, just had the spell of AMS. Still using MJ intermittently, UDS was POS in Jan 2018. Hep C, chronic active. Working on referral. Agatha Craig with seeing Dr. Carl Medina at [facility], but some financial issues. Results for orders placed or performed in visit on 10/12/17   HEPATITIS C QT BY PCR WITH REFLEX GENOTYPE   Result Value Ref Range    Hepatitis C Quantitation See Final Results IU/mL    HCV RNA-IU/mL [id number] IU/mL    HCV log10 7.176 log10 IU/mL    TEST INFORMATION Comment    HEPATITIS C GENOTYPE   Result Value Ref Range    Hepatitis C Genotype 2b      Hyperthyroid  Remains on b-blocker. Last several TSH's suppressed. Lab Results   Component Value Date/Time    TSH 0.288 05/23/2017 08:36 AM     PMH, SH, Medications/Allergies: reviewed, on chart. Drinking only a 6 pack a week now. Still smoking, but a little less. ROS:  Constitutional: No fever, chills or weight loss. Respiratory: No cough, SOB   CV: No chest pain or Palpitations    There were no vitals taken for this visit.   Wt Readings from Last 3 Encounters:   01/16/18 237 lb 9.6 oz (107.8 kg)   01/14/18 110 lb (49.9 kg)   12/08/17 123 lb 6.4 oz (56 kg)     Physical Examination: General appearance - alert, thin, frenetic appearing patient with rapid fire and disjointed speech. Mental status - alert, oriented to person, place, and time  Eyes - pupils equal and reactive, extraocular eye movements intact  ENT - bilateral external ears and nose normal. Normal lips  Neck - supple, no significant adenopathy, no thyromegaly or mass  Lymphatics - no palpable lymphadenopathy, no hepatosplenomegaly  Chest - clear to auscultation, no wheezes, rales or rhonchi, symmetric air entry  Heart - normal rate, regular rhythm, normal S1, S2, no murmurs, rubs, clicks or gallops  Extremities - peripheral pulses normal, no pedal edema, no clubbing or cyanosis    A/P  Depression  Untreated, severe, with complications of alcoholism, MJ use, and chronic BZD use. Will set up with Dr. Husam mahajan at [facility]. Doesn't want to boost the elavil, feels funny in the AM with that. Will defer to DR. Thorpe/ROXANNA Og, but if worsening sx of depression, need to present to the ER. DJD with Chronic pain  Reviewed , in goal. Off methadone. Can't use motrin due to gastritis. Con't Amitriptyline 50mg every day, helping with depression and chronic pain. Con't Topiramate 100mg BID for pain modulation, seizure prophy, and headache prophylaxis. Hep C  Active. Has financial issues with affording treatment though. Interested in seeing Dr. Mo End when finances improve. Hyperthyroid  Con't b-blocker for now, consider check TSI, TPO ab with next labs, but has financial issues, plan on addressing thyroid US next spring if finances are better. Essential hypertension  BP is better, ok for now. con't. Seizure disorder  No recent seizure activity. Doing well on Topiramate. con't. Alcoholism  Con't to work on staying dry. F/U 2mo.

## 2018-02-06 NOTE — TELEPHONE ENCOUNTER
Faith Tavreas called. She wants to know if Dr. Niesha Carpenter has made an appointment for to go to [facility]. Please call.

## 2018-02-06 NOTE — TELEPHONE ENCOUNTER
Thu 3/15/18  1:30 PM     Pt aware of appointment.   2/6/2018 9:40 AM Addendum:    Niraj Blakcman LPN

## 2018-03-02 PROBLEM — Q67.6 PECTUS EXCAVATUM: Status: ACTIVE | Noted: 2018-01-01

## 2018-03-02 PROBLEM — E87.6 HYPOKALEMIA: Status: RESOLVED | Noted: 2018-01-14 | Resolved: 2018-01-01

## 2018-03-02 NOTE — MR AVS SNAPSHOT
[address] [facility] 
 
 
 [address] [address] [phone] Patient: Claribel Potts MRN: [id number] :1960 Visit Information Date & Time Provider Department Dept. Phone Encounter #  
 3/2/2018  7:50 AM Savi Tucker MD [address] [id number] Upcoming Health Maintenance Date Due  
 PAP AKA CERVICAL CYTOLOGY 1982 FOBT Q 1 YEAR AGE 50-75 2011 BREAST CANCER SCRN MAMMOGRAM 2019 DTaP/Tdap/Td series (2 - Td) 3/2/2028 Allergies as of 3/2/2018  Review Complete On: 3/2/2018 By: Savi Tucker MD  
  
 Severity Noted Reaction Type Reactions Labetalol Medium 2016    Diarrhea Losartan Medium 2016    Diarrhea GI Intolerance Nsaids (Non-steroidal Anti-inflammatory Drug)  2018    Other (comments) Must Avoid. Current Immunizations  Reviewed on 8/15/2017 No immunizations on file. Not reviewed this visit Vitals BP Pulse Temp Resp Height(growth percentile) Weight(growth percentile) 120/78 (BP 1 Location: Left arm, BP Patient Position: Sitting) 78 97.5 °F (36.4 °C) (Oral) 10 5' 5\" (1.651 m) 118 lb 3.2 oz (53.6 kg) SpO2 BMI OB Status Smoking Status 98% 19.67 kg/m2 Menopause Current Every Day Smoker BMI and BSA Data Body Mass Index Body Surface Area  
 19.67 kg/m 2 1.57 m 2 Preferred Pharmacy Pharmacy Name Phone [address], [address] [facility] AT [facility] OF  3 & [address] [phone] Your Updated Medication List  
  
   
This list is accurate as of 3/2/18 10:19 AM.  Always use your most recent med list.  
  
  
  
  
 amitriptyline 50 mg tablet Commonly known as:  ELAVIL Take 1 Tab by mouth nightly. Indications: NEUROPATHIC PAIN  
  
 amLODIPine 10 mg tablet Commonly known as:  Kym Glatter Take 1 Tab by mouth daily. fluticasone 50 mcg/actuation nasal spray Commonly known as:  Alfrieda Pillar 2 Sprays by Both Nostrils route daily. LORazepam 1 mg tablet Commonly known as:  ATIVAN  
BID as needed for anxiety  Indications: nerves  
  
 meloxicam 15 mg tablet Commonly known as:  MOBIC Take 1 Tab by mouth daily as needed (arthritis). nebivolol 20 mg tablet Commonly known as:  BYSTOLIC Take 1 Tab by mouth daily. Indications: hypertension  
  
 omeprazole 20 mg capsule Commonly known as:  PRILOSEC Take 20 mg by mouth daily. topiramate 100 mg tablet Commonly known as:  TOPAMAX Take 1 Tab by mouth two (2) times a day. Indications: nerves and seizures To-Do List   
 03/15/2018 1:30 PM  
  Appointment with Pearl Schmidt NP at [address] [place] [address] ([phone]) Introducing [facility] & [facility] SERVICES! [facility] introduces Wazoku patient portal. Now you can access parts of your medical record, email your doctor's office, and request medication refills online. 1. In your internet browser, go to [email or web address] 2. Click on the First Time User? Click Here link in the Sign In box. You will see the New Member Sign Up page. 3. Enter your Wazoku Access Code exactly as it appears below. You will not need to use this code after youve completed the sign-up process. If you do not sign up before the expiration date, you must request a new code. · Wazoku Access Code: [email or web address] Expires: 4/14/2018  9:08 PM 
 
4. Enter the last four digits of your Social Security Number (xxxx) and Date of Birth (mm/dd/yyyy) as indicated and click Submit. You will be taken to the next sign-up page. 5. Create a [employer] ID. This will be your Wazoku login ID and cannot be changed, so think of one that is secure and easy to remember. 6. Create a [employer] password. You can change your password at any time. 7. Enter your Password Reset Question and Answer.  This can be used at a later time if you forget your password. 8. Enter your e-mail address. You will receive e-mail notification when new information is available in [address] 9. Click Sign Up. You can now view and download portions of your medical record. 10. Click the Download Summary menu link to download a portable copy of your medical information. If you have questions, please visit the Frequently Asked Questions section of the Ind[employer] website. Remember, [employer] is NOT to be used for urgent needs. For medical emergencies, dial 911. Now available from your iPhone and Android! Please provide this summary of care documentation to your next provider. Your primary care clinician is listed as Amalia Ying. If you have any questions after today's visit, please call [phone].

## 2018-03-02 NOTE — PROGRESS NOTES
Herminia Morris is a 65 y.o. female presenting for/with:    Medication Problem (follow up )    HPI:  Pt here for routine pap. PMH, SH, Medications/Allergies: reviewed, on chart. ROS:  Constitutional: No fever, chills or weight loss  Respiratory: No cough, SOB   CV: No chest pain or Palpitations    Visit Vitals    /78 (BP 1 Location: Left arm, BP Patient Position: Sitting)    Pulse 78    Temp 97.5 °F (36.4 °C) (Oral)    Resp 10    Ht 5' 5\" (1.651 m)    Wt 118 lb 3.2 oz (53.6 kg)    SpO2 98%    BMI 19.67 kg/m2     Wt Readings from Last 3 Encounters:   03/02/18 118 lb 3.2 oz (53.6 kg)   02/02/18 118 lb 6.4 oz (53.7 kg)   01/16/18 237 lb 9.6 oz (107.8 kg)       Physical Examination: General appearance - alert, well appearing, thin cauc F in no distress  Mental status - alert, oriented to person, place, and time  Eyes - pupils equal and reactive, extraocular eye movements intact  ENT - bilateral external ears and nose normal. Normal lips  Neck - supple, no significant adenopathy, no thyromegaly or mass  Lymphatics - no palpable lymphadenopathy, no hepatosplenomegaly  Chest - clear to auscultation, no wheezes, rales or rhonchi, symmetric air entry. Pectus excavatum present. Breasts: breasts appear normal, no suspicious masses, no skin or nipple changes or axillary nodes. Heart - normal rate, regular rhythm, normal S1, S2, no murmurs, rubs, clicks or gallops  : vulva atrophic with caruncle, nttp, to the urethra. Nl cervix, uterus, adnexa, no masses. Extremities - peripheral pulses normal, no pedal edema, no clubbing or cyanosis    A/P:  Well F  Pap collected today. Breast exam benign.

## 2018-03-15 PROBLEM — F10.11 ALCOHOL USE DISORDER, MILD, IN EARLY REMISSION, ABUSE: Status: ACTIVE | Noted: 2018-01-01

## 2018-03-15 NOTE — TELEPHONE ENCOUNTER
Nurse for [facility] Jasen Coleman called about patient. Wanted to check with you to see if it would be ok to start patient on Cymbalta , there is a concerned about liver function test being elevetaed . If you agree that patient can start medication you can call them @ [phone] or can e-mail Josey Plummer.

## 2018-06-04 NOTE — MR AVS SNAPSHOT
[address] [facility] [phone] Patient: Jen Blancas MRN: [id number] :1960 Visit Information Date & Time Provider Department Dept. Phone Encounter #  
 2018  8:30 AM Jimi Simpson MD [address] [place] [address] [id number] Follow-up Instructions Return in about 3 months (around 2018). Follow-up and Disposition History Upcoming Health Maintenance Date Due FOBT Q 1 YEAR AGE 50-75 2011 Influenza Age 5 to Adult 2018 BREAST CANCER SCRN MAMMOGRAM 2019 PAP AKA CERVICAL CYTOLOGY 3/5/2021 DTaP/Tdap/Td series (2 - Td) 3/2/2028 Allergies as of 2018  Review Complete On: 2018 By: Jimi Simpson MD  
  
 Severity Noted Reaction Type Reactions Labetalol Medium 2016    Diarrhea Losartan Medium 2016    Diarrhea GI Intolerance Nsaids (Non-steroidal Anti-inflammatory Drug)  2018    Other (comments) Must Avoid. Current Immunizations  Reviewed on 8/15/2017 No immunizations on file. Not reviewed this visit You Were Diagnosed With   
  
 Codes Comments Essential hypertension    -  Primary ICD-10-CM: I10 
ICD-9-CM: 401.9 Seizure disorder ([facility]     ICD-10-CM: G40.909 ICD-9-CM: 345.90 Current mild episode of major depressive disorder without prior episode ([facility].)     ICD-10-CM: F32.0 ICD-9-CM: 296.21 Chronic hepatitis C without hepatic coma (HCC)     ICD-10-CM: B18.2 ICD-9-CM: 070.54 Alcohol use disorder, mild, in early remission, abuse     ICD-10-CM: F10.11 ICD-9-CM: 305.03 Hypokalemia     ICD-10-CM: E87.6 ICD-9-CM: 276.8 Hypomagnesemia     ICD-10-CM: P57.13 
ICD-9-CM: 275.2 Hypophosphatasia     ICD-10-CM: E83.39 
ICD-9-CM: 275.3 Hyperthyroidism     ICD-10-CM: E05.90 ICD-9-CM: 242.90  Essential hypertension with goal blood pressure less than 130/80 ICD-10-CM: I10 
ICD-9-CM: 401.9 Vitals BP Pulse Temp Resp Height(growth percentile) Weight(growth percentile) 156/72 (BP 1 Location: Right arm, BP Patient Position: Sitting) 67 98.6 °F (37 °C) (Oral) 12 5' 4\" (1.626 m) 117 lb 9.6 oz (53.3 kg) SpO2 BMI OB Status Smoking Status 98% 20.19 kg/m2 Menopause Current Every Day Smoker BMI and BSA Data Body Mass Index Body Surface Area  
 20.19 kg/m 2 1.55 m 2 Preferred Pharmacy Pharmacy Name Phone [address], [address] [facility] AT [facility] OF  [address] & [address] Brooklynn Price [phone] Your Updated Medication List  
  
   
This list is accurate as of 6/4/18  9:48 AM.  Always use your most recent med list.  
  
  
  
  
 amitriptyline 50 mg tablet Commonly known as:  ELAVIL Take 1 Tab by mouth nightly. Indications: NEUROPATHIC PAIN  
  
 amLODIPine 10 mg tablet Commonly known as:  Job Dub Take 1 Tab by mouth daily. fluticasone 50 mcg/actuation nasal spray Commonly known as:  Teri Langdon 2 Sprays by Both Nostrils route daily. LORazepam 1 mg tablet Commonly known as:  ATIVAN  
BID as needed for anxiety  Indications: nerves  
  
 meloxicam 15 mg tablet Commonly known as:  MOBIC  
TAKE 1 TABLET BY MOUTH DAILY AS NEEDED FOR ARTHRITIS  
  
 nebivolol 20 mg tablet Commonly known as:  BYSTOLIC Take 1 Tab by mouth daily. Indications: hypertension  
  
 omeprazole 20 mg capsule Commonly known as:  PRILOSEC Take 20 mg by mouth daily. sertraline 25 mg tablet Commonly known as:  ZOLOFT Take 1 Tab by mouth daily. topiramate 100 mg tablet Commonly known as:  TOPAMAX Take 1 Tab by mouth two (2) times a day. Indications: nerves and seizures Prescriptions Sent to Pharmacy Refills  
 amLODIPine (NORVASC) 10 mg tablet 3 Sig: Take 1 Tab by mouth daily.   
 Class: Normal  
 [facility], [address] [facility] AT [address] Ph #: [phone] Route: Oral  
  
We Performed the Following MAGNESIUM [id number] CPT(R)] METABOLIC PANEL, COMPREHENSIVE [97670 CPT(R)] PHOSPHORUS [76914 CPT(R)] THYROID ANTIBODY PANEL [61807 CPT(R)] THYROID STIMULATING IMMUNOGLOBULIN [id number] CPT(R)] TSH RFX ON ABNORMAL TO FREE T4 [[id number] Custom] Follow-up Instructions Return in about 3 months (around 9/4/2018). To-Do List   
 06/18/2018 9:00 AM  
  Appointment with Felipe Caldwell NP at [address] [facility] ([phone]) Patient Instructions Hyperthyroidism: Care Instructions Your Care Instructions Hyperthyroidism occurs when the thyroid gland makes too much thyroid hormone. This speeds up your metabolism-how your body uses energy. This condition can cause you to be very active, lose weight, and have sleep problems, eye problems, and a fast heart rate. It can also cause a goiter. A goiter is an enlarged thyroid gland that you can see at the front of the neck. Hyperthyroidism is often caused by Graves' disease. In Graves' disease, the body's defense (immune) system attacks the thyroid gland. Your doctor may prescribe a beta-blocker medicine to slow your pulse and calm you down. But this is not a treatment for hyperthyroidism. It is given for your fast heart rate. Your doctor may also give you antithyroid medicine. This medicine keeps excess thyroid hormone in check. In some cases, doctors recommend radioactive iodine or surgery to remove the thyroid. After either of these treatments, you may need to take medicine to replace thyroid hormone for the rest of your life. Follow-up care is a key part of your treatment and safety. Be sure to make and go to all appointments, and call your doctor if you are having problems.  It's also a good idea to know your test results and keep a list of the medicines you take. How can you care for yourself at home? · Take your medicines exactly as prescribed. You need to take the thyroid medicine at the same time each day. Call your doctor if you think you are having a problem with your medicine. · Graves' disease can make your eyes sore. Use artificial tears, eye drops, and sunglasses to protect your eyes from dryness, wind, and sun. Raise your head with pillows at night to prevent your eyes from swelling. In some cases, taping your eyelids shut at night will keep your eyes from being dry in the morning. · Make sure you get enough calcium. Foods that are rich in calcium include milk, yogurt, cheese, and dark green vegetables. · If you need to gain weight, ask your doctor about special diets. · Do not eat kelp. Otila [place] is high in iodine, which can make hyperthyroidism worse. Otila [place] is commonly used in [employer] and other [place] foods. You can use iodized salt and eat bread and seafood. Try to eat a balanced diet. · Do not use caffeine and other stimulants. These can make symptoms worse, such as a fast heartbeat, nervousness, and problems focusing. · Do not smoke. Smoking can make your condition worse and may lead to more serious eye problems. If you need help quitting, talk to your doctor about stop-smoking programs and medicines. These can increase your chances of quitting for good. · Lower your stress. Learn to use biofeedback, guided imagery, meditation, or other methods to relax. · Use creams or ointments for irritated skin. Ask your doctor which type to use. · Tell all your doctors about your condition. They need to know because some medicines contain iodine. When should you call for help? Call your doctor now or seek immediate medical care if: 
? · You have symptoms of a sudden, very high thyroid level (thyroid storm). These include: ¨ Being nauseated, vomiting, and having diarrhea. ¨ Sweating a lot. ¨ Feeling extremely restless and confused. ¨ Having a high fever. ¨ Having a fast heartbeat. ? · You have sudden vision changes or eye pain. ? · You have a fever or severe sore throat and are taking antithyroid medicines, such as PTU or methimazole. ? Watch closely for changes in your health, and be sure to contact your doctor if: 
? · You have a sore throat or have problems swallowing. ? · You have swollen, itchy, or red eyes or your other eye symptoms get worse, or you have new vision problems. ? · You have signs of a low thyroid level (hypothyroidism). You may feel very tired, confused, or weak. Where can you learn more? Go to http://charlie-alicja.info/. Enter N223 in the search box to learn more about \"Hyperthyroidism: Care Instructions. \" Current as of: May 12, 2017 Content Version: 11.4 © 1777-6993 [employer]. Care instructions adapted under license by [employer] (which disclaims liability or warranty for this information). If you have questions about a medical condition or this instruction, always ask your healthcare professional. [place] [address] any warranty or liability for your use of this information. If you have any questions regarding [employer], you may call [employer] support at [phone]. Patient Instructions History Introducing [facility] & HEALTH SERVICES! [facility] introduces [employer] patient portal. Now you can access parts of your medical record, email your doctor's office, and request medication refills online. 1. In your internet browser, go to [email or web address] 2. Click on the First Time User? Click Here link in the Sign In box. You will see the New Member Sign Up page. 3. Enter your [employer] Access Code exactly as it appears below. You will not need to use this code after youve completed the sign-up process. If you do not sign up before the expiration date, you must request a new code. · Outerstuff Access Code: [email or web address] Expires: 7/15/2018  4:08 PM 
 
4. Enter the last four digits of your Social Security Number (xxxx) and Date of Birth (mm/dd/yyyy) as indicated and click Submit. You will be taken to the next sign-up page. 5. Create a Outerstuff ID. This will be your Outerstuff login ID and cannot be changed, so think of one that is secure and easy to remember. 6. Create a Out[employer]stuff password. You can change your password at any time. 7. Enter your Password Reset Question and Answer. This can be used at a later time if you forget your password. 8. Enter your e-mail address. You will receive e-mail notification when new information is available in [address] 9. Click Sign Up. You can now view and download portions of your medical record. 10. Click the Download Summary menu link to download a portable copy of your medical information. If you have questions, please visit the Frequently Asked Questions section of the Outerstuff website. Remember, Outerstuff is NOT to be used for urgent needs. For medical emergencies, dial 911. Now available from your iPhone and Android! Please provide this summary of care documentation to your next provider. Your primary care clinician is listed as Saúl Ying. If you have any questions after today's visit, please call [phone].

## 2018-06-04 NOTE — PROGRESS NOTES
6/4/2018    Chief Complaint   Patient presents with   [facility] Follow Up     from kaz     HPI: Ravindra Rankin is a 63 y.o. female.  WF who lives with her ex- and son. HTN  Up today. Will see how thyroid looking, if still hyperthyroid, will focus on that, as her BP is likely related to that. Acute metabolic encephalopathy   No sx since d/c. Stay off alcohol.      Chronic alcohol dependence/abuse  Has been in remission since 1/2018. Seeing MIKAYLA Cruz for mental health issues regualarly.         Hypokalemia, hypophosphatemia, hypomagnesemia (POA)   Off CTL since hosp. 1/2018 Due for recheck. Check today.      Hx of hepatitis C with transaminitis  Off EtOH. Due to see Dr. Gillian Brandon soon to review. Had liver US at [facility]. Did get an EGD with Dr. Gillian Brandon from [facility] near [facility]. Showed gastritis.      Seizure disorder (chronic)  Off keppra, on just topamax. Asyx. Taking regularly. No known sz since last visit. No drinking since Jan 2018. Still using MJ intermittently, UDS was POS in Jan 2018.       Hyperthyroidism -   T4 and T3 were in normal ranges, and TSH was mildly suppressed. There are many possible explanations for this, including alcoholism, graves disease, hashimotos', and toxic goiter. Due for recheck. Pt with complicated history of chronic pain, depression, anxiety. History of ETOH abuse, with possible ETOH withdrawal seizures. After recent hospitalization has not been drinking at all. Mood and emotional lability are a little worse lately though. Chronic pain. Diagnosis DJD C-spine. Currently off methadone. Using motrin 600mg TID prn pain. Neuromodulator: keppra and topamax  Antidepressant: Remains on elavil 50mg. nadine well. Denies Constipation, Sedation. No concerns for overuse or diversion seen, but has been using MJ off and on lately. Med use agreement reviewed, on chart. Hypertension. Blood pressures have been worsening again.  Pulse ok on bystolic 73OF every day. Affording that ok. Current regimen: beta-blocker, calcium channel blocker. Pt off thiazide diuretic (chlorthalidone) since  hosp stopped that due to low K+. Patient denies chest pain, palpitations, peripheral edema. Lab review:   Lab Results   Component Value Date/Time    Sodium 131 (L) 01/16/2018 03:53 AM    Potassium 2.8 (L) 01/16/2018 03:53 AM    Chloride 98 01/16/2018 03:53 AM    CO2 24 01/16/2018 03:53 AM    Anion gap 9 01/16/2018 03:53 AM    Glucose 98 01/16/2018 03:53 AM    BUN 6 01/16/2018 03:53 AM    Creatinine 0.61 01/16/2018 03:53 AM    BUN/Creatinine ratio 10 (L) 01/16/2018 03:53 AM    GFR est AA >60 01/16/2018 03:53 AM    GFR est non-AA >60 01/16/2018 03:53 AM    Calcium 8.1 (L) 01/16/2018 03:53 AM     Lab Results   Component Value Date/Time    Cholesterol, total 213 (H) 10/12/2017 10:27 AM    HDL Cholesterol 80 10/12/2017 10:27 AM    LDL, calculated 100 (H) 10/12/2017 10:27 AM    VLDL, calculated 33 10/12/2017 10:27 AM    Triglyceride 163 (H) 10/12/2017 10:27 AM     Hep C, chronic active. Working on referral. Saw Dr. Esteban Nugent earlier this year for EGD, but hasn't had her hep C addressed yet. Results for orders placed or performed in visit on 10/12/17   HEPATITIS C QT BY PCR WITH REFLEX GENOTYPE   Result Value Ref Range    Hepatitis C Quantitation See Final Results IU/mL    HCV RNA-IU/mL [id number] IU/mL    HCV log10 7.176 log10 IU/mL    TEST INFORMATION Comment    HEPATITIS C GENOTYPE   Result Value Ref Range    Hepatitis C Genotype 2b      Hyperthyroid  Remains on b-blocker. Last several TSH's suppressed. Ready to persue workup. Lab Results   Component Value Date/Time    TSH 0.288 (L) 05/23/2017 08:36 AM     PMH, SH, Medications/Allergies: reviewed, on chart. Drinking only a 6 pack a week now. Still smoking, but a little less. ROS:  Constitutional: No fever, chills or weight loss.   Respiratory: No cough, SOB   CV: No chest pain or Palpitations    Visit Vitals    /72 (BP 1 Location: Right arm, BP Patient Position: Sitting)    Pulse 67    Temp 98.6 °F (37 °C) (Oral)    Resp 12    Ht 5' 4\" (1.626 m)    Wt 117 lb 9.6 oz (53.3 kg)    SpO2 98%    BMI 20.19 kg/m2     Wt Readings from Last 3 Encounters:   06/04/18 117 lb 9.6 oz (53.3 kg)   03/15/18 117 lb 9.6 oz (53.3 kg)   03/02/18 118 lb 3.2 oz (53.6 kg)     Physical Examination: General appearance - alert, thin, frenetic appearing patient with rapid fire and disjointed speech. Mental status - alert, oriented to person, place, and time  Eyes - pupils equal and reactive, extraocular eye movements intact  ENT - bilateral external ears and nose normal. Normal lips  Neck - supple, no significant adenopathy, no thyromegaly or mass  Lymphatics - no palpable lymphadenopathy, no hepatosplenomegaly  Chest - clear to auscultation, no wheezes, rales or rhonchi, symmetric air entry  Heart - normal rate, regular rhythm, normal S1, S2, no murmurs, rubs, clicks or gallops  Extremities - peripheral pulses normal, no pedal edema, no clubbing or cyanosis    A/P  HA's  With elev BP's. See how thyroid looking, as below. Depression  Improving. Hx severe sx, with complications of alcoholism, MJ use, and chronic BZD use. con't to see NP Radha Simpson at [facility]. If worsening sx of depression, need to present to the ER. DJD with Chronic pain  Off methadone. Can't use motrin due to gastritis. Con't Amitriptyline 50mg every day, helping with depression and chronic pain. Con't Topiramate 100mg BID for pain modulation, seizure prophy, and headache prophylaxis. Hep C  Active. Pt plans to see Dr. Sarah Wyman soon, pt wants to arrange. Hyperthyroid  Sx so/so, BP up and now with HA's. Check TSI, TPO ab, and if still abn, plan thyroid US. Con't b-blocker for now    Essential hypertension  BP is still high. Work on improving control. If TSH still off, will focus on thyroid, but if ok now that pt not drinking, consider try low dose aldactone 12.5 daily.     Seizure disorder  No recent seizure activity. Doing well on Topiramate. con't. Alcoholism  Con't to work on staying dry. F/U 3mo.

## 2018-06-04 NOTE — PATIENT INSTRUCTIONS
Hyperthyroidism: Care Instructions  Your Care Instructions  Hyperthyroidism occurs when the thyroid gland makes too much thyroid hormone. This speeds up your metabolism-how your body uses energy. This condition can cause you to be very active, lose weight, and have sleep problems, eye problems, and a fast heart rate. It can also cause a goiter. A goiter is an enlarged thyroid gland that you can see at the front of the neck. Hyperthyroidism is often caused by Graves' disease. In Graves' disease, the body's defense (immune) system attacks the thyroid gland. Your doctor may prescribe a beta-blocker medicine to slow your pulse and calm you down. But this is not a treatment for hyperthyroidism. It is given for your fast heart rate. Your doctor may also give you antithyroid medicine. This medicine keeps excess thyroid hormone in check. In some cases, doctors recommend radioactive iodine or surgery to remove the thyroid. After either of these treatments, you may need to take medicine to replace thyroid hormone for the rest of your life. Follow-up care is a key part of your treatment and safety. Be sure to make and go to all appointments, and call your doctor if you are having problems. It's also a good idea to know your test results and keep a list of the medicines you take. How can you care for yourself at home? · Take your medicines exactly as prescribed. You need to take the thyroid medicine at the same time each day. Call your doctor if you think you are having a problem with your medicine. · Graves' disease can make your eyes sore. Use artificial tears, eye drops, and sunglasses to protect your eyes from dryness, wind, and sun. Raise your head with pillows at night to prevent your eyes from swelling. In some cases, taping your eyelids shut at night will keep your eyes from being dry in the morning. · Make sure you get enough calcium.  Foods that are rich in calcium include milk, yogurt, cheese, and dark green vegetables. · If you need to gain weight, ask your doctor about special diets. · Do not eat kelp. My Rolls is high in iodine, which can make hyperthyroidism worse. My Rolls is commonly used in [employer] and other [place] foods. You can use iodized salt and eat bread and seafood. Try to eat a balanced diet. · Do not use caffeine and other stimulants. These can make symptoms worse, such as a fast heartbeat, nervousness, and problems focusing. · Do not smoke. Smoking can make your condition worse and may lead to more serious eye problems. If you need help quitting, talk to your doctor about stop-smoking programs and medicines. These can increase your chances of quitting for good. · Lower your stress. Learn to use biofeedback, guided imagery, meditation, or other methods to relax. · Use creams or ointments for irritated skin. Ask your doctor which type to use. · Tell all your doctors about your condition. They need to know because some medicines contain iodine. When should you call for help? Call your doctor now or seek immediate medical care if:  ? · You have symptoms of a sudden, very high thyroid level (thyroid storm). These include:  ¨ Being nauseated, vomiting, and having diarrhea. ¨ Sweating a lot. ¨ Feeling extremely restless and confused. ¨ Having a high fever. ¨ Having a fast heartbeat. ? · You have sudden vision changes or eye pain. ? · You have a fever or severe sore throat and are taking antithyroid medicines, such as PTU or methimazole. ? Watch closely for changes in your health, and be sure to contact your doctor if:  ? · You have a sore throat or have problems swallowing. ? · You have swollen, itchy, or red eyes or your other eye symptoms get worse, or you have new vision problems. ? · You have signs of a low thyroid level (hypothyroidism). You may feel very tired, confused, or weak. Where can you learn more? Go to http://charlie-alicja.info/.   Enter X572 in the search box to learn more about \"Hyperthyroidism: Care Instructions. \"  Current as of: May 12, 2017  Content Version: 11.4  © 7339-5243 [employer]. Care instructions adapted under license by [employer] (which disclaims liability or warranty for this information). If you have questions about a medical condition or this instruction, always ask your healthcare professional. [address] any warranty or liability for your use of this information. If you have any questions regarding [employer], you may call [employer] support at [phone].

## 2018-06-04 NOTE — PROGRESS NOTES
1. Have you been to the ER, urgent care clinic since your last visit? Hospitalized since your last visit? Yes    2. Have you seen or consulted any other health care providers outside of the [address] [place] [address] since your last visit? Include any pap smears or colon screening.  No

## 2018-06-06 NOTE — PROGRESS NOTES
Labs look a lot better. Thyroid normalized. Salt levels mostly back to normal. Liver looking better. Keep up the good work.

## 2018-07-26 NOTE — TELEPHONE ENCOUNTER
----- Message from Mavis Kocher sent at 7/26/2018  7:48 AM EDT -----  Regarding: Dr Saurabh Acuna  Pt would like to speak to the doctor or nurse about why the doctor stop her Meloxicam 15 mg , this is the only medication that works for her, please call pt at [phone].

## 2018-11-14 PROBLEM — F10.99 ALCOHOL USE WITH ALCOHOL-INDUCED DISORDER (HCC): Status: ACTIVE | Noted: 2018-01-01

## 2019-01-01 DIAGNOSIS — F33.1 MODERATE EPISODE OF RECURRENT MAJOR DEPRESSIVE DISORDER (HCC): ICD-10-CM

## 2019-01-01 DIAGNOSIS — F41.9 ANXIETY: ICD-10-CM

## 2019-01-01 DIAGNOSIS — G89.4 CHRONIC PAIN SYNDROME: ICD-10-CM

## 2019-01-01 RX ORDER — AMITRIPTYLINE HYDROCHLORIDE 50 MG/1
TABLET, FILM COATED ORAL
Qty: 90 TAB | Refills: 2 | Status: SHIPPED | OUTPATIENT
Start: 2019-01-01

## 2019-01-01 RX ORDER — AMITRIPTYLINE HYDROCHLORIDE 50 MG/1
TABLET, FILM COATED ORAL
Qty: 90 TAB | Refills: 0 | OUTPATIENT
Start: 2019-01-01

## 2019-01-17 PROBLEM — F10.99 ALCOHOL USE WITH ALCOHOL-INDUCED DISORDER (HCC): Status: RESOLVED | Noted: 2018-01-01 | Resolved: 2019-01-01

## 2019-01-17 PROBLEM — F10.21 ALCOHOLISM IN REMISSION (HCC): Status: ACTIVE | Noted: 2019-01-01

## 2022-04-30 NOTE — TELEPHONE ENCOUNTER
----- Message from Jeanne Rodriguez sent at 11/17/2017  9:29 AM EST -----  Regarding: Dr. Maksim Will  Pt requested test results from a [facility]th a ago, and left a message on yesterday but never received a call back. Best contact number [phone].
[employer] dose 1, 2, and 3